# Patient Record
Sex: MALE | Race: WHITE | HISPANIC OR LATINO | Employment: FULL TIME | URBAN - METROPOLITAN AREA
[De-identification: names, ages, dates, MRNs, and addresses within clinical notes are randomized per-mention and may not be internally consistent; named-entity substitution may affect disease eponyms.]

---

## 2019-12-02 ENCOUNTER — APPOINTMENT (OUTPATIENT)
Dept: RADIOLOGY | Facility: MEDICAL CENTER | Age: 34
End: 2019-12-02
Attending: EMERGENCY MEDICINE

## 2019-12-02 ENCOUNTER — HOSPITAL ENCOUNTER (EMERGENCY)
Facility: MEDICAL CENTER | Age: 34
End: 2019-12-02
Attending: EMERGENCY MEDICINE

## 2019-12-02 VITALS
RESPIRATION RATE: 16 BRPM | DIASTOLIC BLOOD PRESSURE: 78 MMHG | WEIGHT: 224 LBS | TEMPERATURE: 98.7 F | BODY MASS INDEX: 37.32 KG/M2 | HEART RATE: 76 BPM | SYSTOLIC BLOOD PRESSURE: 126 MMHG | HEIGHT: 65 IN

## 2019-12-02 DIAGNOSIS — R07.2 PRECORDIAL PAIN: ICD-10-CM

## 2019-12-02 DIAGNOSIS — M79.605 PAIN OF LEFT LOWER EXTREMITY: ICD-10-CM

## 2019-12-02 LAB
ALBUMIN SERPL BCP-MCNC: 4.4 G/DL (ref 3.2–4.9)
ALBUMIN/GLOB SERPL: 1.7 G/DL
ALP SERPL-CCNC: 58 U/L (ref 30–99)
ALT SERPL-CCNC: 37 U/L (ref 2–50)
ANION GAP SERPL CALC-SCNC: 10 MMOL/L (ref 0–11.9)
AST SERPL-CCNC: 19 U/L (ref 12–45)
BASOPHILS # BLD AUTO: 0.7 % (ref 0–1.8)
BASOPHILS # BLD: 0.06 K/UL (ref 0–0.12)
BILIRUB SERPL-MCNC: 0.4 MG/DL (ref 0.1–1.5)
BUN SERPL-MCNC: 14 MG/DL (ref 8–22)
CALCIUM SERPL-MCNC: 9.2 MG/DL (ref 8.5–10.5)
CHLORIDE SERPL-SCNC: 105 MMOL/L (ref 96–112)
CO2 SERPL-SCNC: 22 MMOL/L (ref 20–33)
CREAT SERPL-MCNC: 0.69 MG/DL (ref 0.5–1.4)
D DIMER PPP IA.FEU-MCNC: <0.4 UG/ML (FEU) (ref 0–0.5)
EKG IMPRESSION: NORMAL
EOSINOPHIL # BLD AUTO: 0.23 K/UL (ref 0–0.51)
EOSINOPHIL NFR BLD: 2.6 % (ref 0–6.9)
ERYTHROCYTE [DISTWIDTH] IN BLOOD BY AUTOMATED COUNT: 44 FL (ref 35.9–50)
GLOBULIN SER CALC-MCNC: 2.6 G/DL (ref 1.9–3.5)
GLUCOSE SERPL-MCNC: 115 MG/DL (ref 65–99)
HCT VFR BLD AUTO: 42.8 % (ref 42–52)
HGB BLD-MCNC: 14.5 G/DL (ref 14–18)
IMM GRANULOCYTES # BLD AUTO: 0.02 K/UL (ref 0–0.11)
IMM GRANULOCYTES NFR BLD AUTO: 0.2 % (ref 0–0.9)
LIPASE SERPL-CCNC: 19 U/L (ref 11–82)
LYMPHOCYTES # BLD AUTO: 2.76 K/UL (ref 1–4.8)
LYMPHOCYTES NFR BLD: 30.8 % (ref 22–41)
MCH RBC QN AUTO: 30.8 PG (ref 27–33)
MCHC RBC AUTO-ENTMCNC: 33.9 G/DL (ref 33.7–35.3)
MCV RBC AUTO: 90.9 FL (ref 81.4–97.8)
MONOCYTES # BLD AUTO: 0.73 K/UL (ref 0–0.85)
MONOCYTES NFR BLD AUTO: 8.1 % (ref 0–13.4)
NEUTROPHILS # BLD AUTO: 5.16 K/UL (ref 1.82–7.42)
NEUTROPHILS NFR BLD: 57.6 % (ref 44–72)
NRBC # BLD AUTO: 0 K/UL
NRBC BLD-RTO: 0 /100 WBC
PLATELET # BLD AUTO: 206 K/UL (ref 164–446)
PMV BLD AUTO: 10.8 FL (ref 9–12.9)
POTASSIUM SERPL-SCNC: 3.4 MMOL/L (ref 3.6–5.5)
PROT SERPL-MCNC: 7 G/DL (ref 6–8.2)
RBC # BLD AUTO: 4.71 M/UL (ref 4.7–6.1)
SODIUM SERPL-SCNC: 137 MMOL/L (ref 135–145)
TROPONIN T SERPL-MCNC: <6 NG/L (ref 6–19)
WBC # BLD AUTO: 9 K/UL (ref 4.8–10.8)

## 2019-12-02 PROCEDURE — 80053 COMPREHEN METABOLIC PANEL: CPT

## 2019-12-02 PROCEDURE — 84484 ASSAY OF TROPONIN QUANT: CPT

## 2019-12-02 PROCEDURE — 85379 FIBRIN DEGRADATION QUANT: CPT

## 2019-12-02 PROCEDURE — 700111 HCHG RX REV CODE 636 W/ 250 OVERRIDE (IP): Performed by: EMERGENCY MEDICINE

## 2019-12-02 PROCEDURE — 36415 COLL VENOUS BLD VENIPUNCTURE: CPT

## 2019-12-02 PROCEDURE — 99285 EMERGENCY DEPT VISIT HI MDM: CPT

## 2019-12-02 PROCEDURE — 93971 EXTREMITY STUDY: CPT | Mod: 26 | Performed by: INTERNAL MEDICINE

## 2019-12-02 PROCEDURE — 71045 X-RAY EXAM CHEST 1 VIEW: CPT

## 2019-12-02 PROCEDURE — 93971 EXTREMITY STUDY: CPT | Mod: LT

## 2019-12-02 PROCEDURE — 93005 ELECTROCARDIOGRAM TRACING: CPT | Performed by: EMERGENCY MEDICINE

## 2019-12-02 PROCEDURE — 96374 THER/PROPH/DIAG INJ IV PUSH: CPT

## 2019-12-02 PROCEDURE — 85025 COMPLETE CBC W/AUTO DIFF WBC: CPT

## 2019-12-02 PROCEDURE — 83690 ASSAY OF LIPASE: CPT

## 2019-12-02 RX ORDER — ASPIRIN 81 MG/1
324 TABLET, CHEWABLE ORAL ONCE
Status: DISCONTINUED | OUTPATIENT
Start: 2019-12-02 | End: 2019-12-02 | Stop reason: HOSPADM

## 2019-12-02 RX ORDER — OMEPRAZOLE 20 MG/1
20 CAPSULE, DELAYED RELEASE ORAL
COMMUNITY
End: 2020-09-21

## 2019-12-02 RX ORDER — KETOROLAC TROMETHAMINE 30 MG/ML
30 INJECTION, SOLUTION INTRAMUSCULAR; INTRAVENOUS ONCE
Status: COMPLETED | OUTPATIENT
Start: 2019-12-02 | End: 2019-12-02

## 2019-12-02 RX ORDER — NAPROXEN 500 MG/1
500 TABLET ORAL 2 TIMES DAILY WITH MEALS
Qty: 20 TAB | Refills: 0 | Status: SHIPPED | OUTPATIENT
Start: 2019-12-02 | End: 2020-09-21 | Stop reason: CLARIF

## 2019-12-02 RX ADMIN — KETOROLAC TROMETHAMINE 30 MG: 30 INJECTION, SOLUTION INTRAMUSCULAR at 10:27

## 2019-12-02 ASSESSMENT — LIFESTYLE VARIABLES: DO YOU DRINK ALCOHOL: NO

## 2019-12-02 NOTE — ED TRIAGE NOTES
"Chief Complaint   Patient presents with   • Chest Pain     BIB EMS.  Per patient has onset on sternal chest pain starting 2 hrs PTA.  Patient denies SOB, N/V or fever.  NAD.  A&Ox4.  Patient would also like his left foot to be \"checked out\" as it has been painful.  Denies trauma.  No swelling noted.        "

## 2019-12-02 NOTE — ED PROVIDER NOTES
"ED Provider  Scribed for Nigel Lomax D.O. by Brisa Paulino. 12/2/2019  9:57 AM    Means of arrival: Ambulance  History obtained from: Patient  History limited by: None    CHIEF COMPLAINT  •  Extremity Pain  •  Chest Pain    HPI  Sridhar Underwood is a 34 y.o. male who presents to the ED by ambulance for extremity pain with an onset of 2 days as well as chest pain which developed today. The patient developed an acute onset of left lower extremity two days ago localized from his foot and ankle extending to the back of his calf and knee. He describes the pain as severe soreness which is gradually worsening. He denies treating his symptoms with any medications. He complains of numbness to his toes and mild discomfort to his right hip. Negative for trauma or injury. Additionally, he complains of acute chest pain to his substernal chest extending to his anterior neck, onset this morning. He has a history of GERD which is treated with Omeprazole. He has experienced similar episodes of chest pain secondary to acid reflux but states his current chest pain is different and more severe. Negative for fevers, chills, sore throat, myalgias, abdominal pain, nausea, vomiting, diarrhea or rash. No prior history of cardiac disease, blood clot disorder, PE or DVT.      REVIEW OF SYSTEMS  See HPI for further details. All other systems are negative.       PAST MEDICAL HISTORY  No prior history of cardiac disease, blood clot disorder, PE or DVT. History of GERD.      SOCIAL HISTORY  Patient is employed.      SURGICAL HISTORY  No recent surgeries.      CURRENT MEDICATIONS  Patient reports taking Omeprazole daily.      ALLERGIES  None      PHYSICAL EXAM  VITAL SIGNS: /87   Pulse 71   Temp 37.1 °C (98.7 °F) (Temporal)   Resp 18   Ht 1.651 m (5' 5\")   Wt 101.6 kg (224 lb)   BMI 37.28 kg/m²     Constitutional: Alert in no apparent distress.  HENT: No signs of trauma, mucous membranes are moist  Eyes: Conjunctiva normal, " Non-icteric.   Neck: Normal range of motion, No tenderness, Supple.  Lymphatic: No lymphadenopathy noted.   Cardiovascular: Regular rate and rhythm, no murmurs.   Thorax & Lungs: Normal breath sounds, No respiratory distress, No wheezing, No chest tenderness.   Abdomen: Bowel sounds normal, Soft, No tenderness, No masses, No pulsatile masses. No peritoneal signs.  Skin: Warm, Dry, normal color.   Back: No bony tenderness, No CVA tenderness.   Extremities: No cyanosis. Left ankle and foot no swelling, tenderness or erythema. Left calf tenderness.  Musculoskeletal: Good range of motion in all major joints. No tenderness to palpation or major deformities noted.   Neurologic: Alert and oriented x4, Normal motor function, Normal sensory function, No focal deficits noted.   Psychiatric: Mild anxiety.       MEDICAL DECISION MAKING  This is a 34 y.o. male who presents primarily with complaints of left ankle foot and calf pain.  He has been atraumatic and he developed having some nonspecific chest discomfort also.  With this it was concerns of a DVT with possible pulmonary embolism so ultrasound of the lower extremity was ordered.  D-dimer was also ordered and was negative.  His ultrasound of the lower extremity was negative d-dimer is negative further imaging of the chest is not necessary.  His cardiac evaluation was negative, his chest x-ray looks normal.  His ultrasound showed no DVT.  He is medicated with Toradol for pain and this did improve his symptoms.  At this time the patient has nonspecific symptoms that I can find no emergent or urgent for.  He he will be treated symptomatically.    DIAGNOSTIC STUDIES / PROCEDURES    EKG  12 Lead EKG interpreted by me shown below.      LABS  Results for orders placed or performed during the hospital encounter of 12/02/19   CBC w/ Differential   Result Value Ref Range    WBC 9.0 4.8 - 10.8 K/uL    RBC 4.71 4.70 - 6.10 M/uL    Hemoglobin 14.5 14.0 - 18.0 g/dL    Hematocrit 42.8  42.0 - 52.0 %    MCV 90.9 81.4 - 97.8 fL    MCH 30.8 27.0 - 33.0 pg    MCHC 33.9 33.7 - 35.3 g/dL    RDW 44.0 35.9 - 50.0 fL    Platelet Count 206 164 - 446 K/uL    MPV 10.8 9.0 - 12.9 fL    Neutrophils-Polys 57.60 44.00 - 72.00 %    Lymphocytes 30.80 22.00 - 41.00 %    Monocytes 8.10 0.00 - 13.40 %    Eosinophils 2.60 0.00 - 6.90 %    Basophils 0.70 0.00 - 1.80 %    Immature Granulocytes 0.20 0.00 - 0.90 %    Nucleated RBC 0.00 /100 WBC    Neutrophils (Absolute) 5.16 1.82 - 7.42 K/uL    Lymphs (Absolute) 2.76 1.00 - 4.80 K/uL    Monos (Absolute) 0.73 0.00 - 0.85 K/uL    Eos (Absolute) 0.23 0.00 - 0.51 K/uL    Baso (Absolute) 0.06 0.00 - 0.12 K/uL    Immature Granulocytes (abs) 0.02 0.00 - 0.11 K/uL    NRBC (Absolute) 0.00 K/uL   Complete Metabolic Panel (CMP)   Result Value Ref Range    Sodium 137 135 - 145 mmol/L    Potassium 3.4 (L) 3.6 - 5.5 mmol/L    Chloride 105 96 - 112 mmol/L    Co2 22 20 - 33 mmol/L    Anion Gap 10.0 0.0 - 11.9    Glucose 115 (H) 65 - 99 mg/dL    Bun 14 8 - 22 mg/dL    Creatinine 0.69 0.50 - 1.40 mg/dL    Calcium 9.2 8.5 - 10.5 mg/dL    AST(SGOT) 19 12 - 45 U/L    ALT(SGPT) 37 2 - 50 U/L    Alkaline Phosphatase 58 30 - 99 U/L    Total Bilirubin 0.4 0.1 - 1.5 mg/dL    Albumin 4.4 3.2 - 4.9 g/dL    Total Protein 7.0 6.0 - 8.2 g/dL    Globulin 2.6 1.9 - 3.5 g/dL    A-G Ratio 1.7 g/dL   Troponin STAT   Result Value Ref Range    Troponin T <6 6 - 19 ng/L   Lipase   Result Value Ref Range    Lipase 19 11 - 82 U/L   D-Dimer (only helpful in low pre-test probability wells critieria. Do not order if patient ruled out by PERC criteria. See Weblinks at top of Labs section)   Result Value Ref Range    D-Dimer Screen <0.40 0.00 - 0.50 ug/mL (FEU)   ESTIMATED GFR   Result Value Ref Range    GFR If African American >60 >60 mL/min/1.73 m 2    GFR If Non African American >60 >60 mL/min/1.73 m 2   EKG (NOW)   Result Value Ref Range    Report       [facility] Emergency Dept.    Test Date:   2019  Pt Name:    DOC BOWLES                 Department: ER  MRN:        [id number]                      Room:       RD 10  Gender:     Male                         Technician: [id number]  :        1985                   Requested By:ER TRIAGE PROTOCOL  Order #:    [id number]                    Reading MD: MARCELLA OLSON D.O.    Measurements  Intervals                                Axis  Rate:       78                           P:          19  CO:         184                          QRS:        12  QRSD:       84                           T:          15  QT:         368  QTc:        420    Interpretive Statements  SINUS RHYTHM  No previous ECG available for comparison  Electronically Signed On 2019 12:14:51 PST by MARCELLA OLSON D.O.        All labs reviewed by me.      RADIOLOGY  DX-CHEST-PORTABLE (1 VIEW)   Final Result      1.  There is no acute cardiopulmonary process.      US-EXTREMITY VENOUS LOWER UNILAT LEFT   Final Result        The radiologist's interpretations of all radiological studies have been reviewed by me.          COURSE  Pertinent Labs & Imaging studies reviewed. (See chart for details)    Obtained and reviewed past medical records shows no prior ED visits.    9:57 AM Patient seen and examined at bedside. Discussed plan of care. The patient will be  medicated with Aspirin 324 mg PO and Toradol 30 mg IV. Ordered for venous US left lower extremity, XR chest, EKG and laboratory testing including lipase, troponin, CMP, estimated GFR, CBC with differential and D-dimer to evaluate his symptoms.     10:45 AM Independently reviewed XR chest revealing no evidence of pneumonia.    12:14 PM On repeat evaluation, pain is almost completely resolved with Toradol. Discussed with him the diagnostic results and plan for admission. Heart score is 0.     Discharge plan was discussed with the patient and includes establishing care with a PCP as soon as possible.  Patient will be discharged with  "a prescription for Naproxen.       Return to the ED for new or persisting symptoms including chest pain, shortness of breath or any additional concerns.  The patient verbalizes understanding and will comply.  Patient is stable at the time of discharge.  Vital signs were reviewed: /87   Pulse 71   Temp 37.1 °C (98.7 °F) (Temporal)   Resp 18   Ht 1.651 m (5' 5\")   Wt 101.6 kg (224 lb)   BMI 37.28 kg/m²        DISPOSITION  Patient will be discharged home in stable condition.      FOLLOW UP  [facility] scheduling  Please call [phone] to make an appoint with a next available practitioner    The patient is referred to a primary physician for blood pressure management, diabetic screening, and for all other preventative health concerns.      OUTPATIENT MEDICATIONS  New Prescriptions    NAPROXEN (NAPROSYN) 500 MG TAB    Take 1 Tab by mouth 2 times a day, with meals.        FINAL IMPRESSION  1. Precordial pain    2. Pain of left lower extremity         Brisa CANSECO (Enzoibjustin), am scribing for, and in the presence of, Nigel Lomax D.O.    Electronically signed by: Brisa Paulino (Scribe), 12/2/2019    Nigel CANSECO D.O. personally performed the services described in this documentation, as scribed by Brisa Paulino in my presence, and it is both accurate and complete. C.     The note accurately reflects work and decisions made by me.  Nigel Lomax  12/2/2019  1:17 PM        "

## 2019-12-02 NOTE — DISCHARGE INSTRUCTIONS
Activity as tolerated, please follow-up with a primary care physician. Please follow up with a primary physician for blood pressure management, diabetic screening, and all other preventive health concerns.

## 2020-04-04 ENCOUNTER — APPOINTMENT (OUTPATIENT)
Dept: RADIOLOGY | Facility: MEDICAL CENTER | Age: 35
End: 2020-04-04
Attending: EMERGENCY MEDICINE
Payer: COMMERCIAL

## 2020-04-04 ENCOUNTER — HOSPITAL ENCOUNTER (EMERGENCY)
Facility: MEDICAL CENTER | Age: 35
End: 2020-04-04
Attending: EMERGENCY MEDICINE
Payer: COMMERCIAL

## 2020-04-04 VITALS
HEART RATE: 81 BPM | OXYGEN SATURATION: 99 % | HEIGHT: 65 IN | WEIGHT: 190 LBS | SYSTOLIC BLOOD PRESSURE: 128 MMHG | BODY MASS INDEX: 31.65 KG/M2 | DIASTOLIC BLOOD PRESSURE: 79 MMHG | RESPIRATION RATE: 18 BRPM | TEMPERATURE: 99.3 F

## 2020-04-04 DIAGNOSIS — K21.00 GASTROESOPHAGEAL REFLUX DISEASE WITH ESOPHAGITIS: ICD-10-CM

## 2020-04-04 LAB
ALBUMIN SERPL BCP-MCNC: 4.4 G/DL (ref 3.2–4.9)
ALBUMIN/GLOB SERPL: 1.4 G/DL
ALP SERPL-CCNC: 75 U/L (ref 30–99)
ALT SERPL-CCNC: 45 U/L (ref 2–50)
ANION GAP SERPL CALC-SCNC: 13 MMOL/L (ref 7–16)
AST SERPL-CCNC: 21 U/L (ref 12–45)
BASOPHILS # BLD AUTO: 0.6 % (ref 0–1.8)
BASOPHILS # BLD: 0.06 K/UL (ref 0–0.12)
BILIRUB SERPL-MCNC: 0.3 MG/DL (ref 0.1–1.5)
BUN SERPL-MCNC: 14 MG/DL (ref 8–22)
CALCIUM SERPL-MCNC: 9.3 MG/DL (ref 8.5–10.5)
CHLORIDE SERPL-SCNC: 100 MMOL/L (ref 96–112)
CO2 SERPL-SCNC: 22 MMOL/L (ref 20–33)
CREAT SERPL-MCNC: 0.62 MG/DL (ref 0.5–1.4)
EOSINOPHIL # BLD AUTO: 0.24 K/UL (ref 0–0.51)
EOSINOPHIL NFR BLD: 2.4 % (ref 0–6.9)
ERYTHROCYTE [DISTWIDTH] IN BLOOD BY AUTOMATED COUNT: 43.4 FL (ref 35.9–50)
GLOBULIN SER CALC-MCNC: 3.1 G/DL (ref 1.9–3.5)
GLUCOSE SERPL-MCNC: 89 MG/DL (ref 65–99)
HCT VFR BLD AUTO: 45.7 % (ref 42–52)
HGB BLD-MCNC: 15.8 G/DL (ref 14–18)
IMM GRANULOCYTES # BLD AUTO: 0.07 K/UL (ref 0–0.11)
IMM GRANULOCYTES NFR BLD AUTO: 0.7 % (ref 0–0.9)
LIPASE SERPL-CCNC: 14 U/L (ref 11–82)
LYMPHOCYTES # BLD AUTO: 2.87 K/UL (ref 1–4.8)
LYMPHOCYTES NFR BLD: 28.4 % (ref 22–41)
MCH RBC QN AUTO: 31.3 PG (ref 27–33)
MCHC RBC AUTO-ENTMCNC: 34.6 G/DL (ref 33.7–35.3)
MCV RBC AUTO: 90.7 FL (ref 81.4–97.8)
MONOCYTES # BLD AUTO: 0.74 K/UL (ref 0–0.85)
MONOCYTES NFR BLD AUTO: 7.3 % (ref 0–13.4)
NEUTROPHILS # BLD AUTO: 6.13 K/UL (ref 1.82–7.42)
NEUTROPHILS NFR BLD: 60.6 % (ref 44–72)
NRBC # BLD AUTO: 0 K/UL
NRBC BLD-RTO: 0 /100 WBC
PLATELET # BLD AUTO: 205 K/UL (ref 164–446)
PMV BLD AUTO: 10.7 FL (ref 9–12.9)
POTASSIUM SERPL-SCNC: 4 MMOL/L (ref 3.6–5.5)
PROT SERPL-MCNC: 7.5 G/DL (ref 6–8.2)
RBC # BLD AUTO: 5.04 M/UL (ref 4.7–6.1)
S PYO DNA SPEC NAA+PROBE: NOT DETECTED
SODIUM SERPL-SCNC: 135 MMOL/L (ref 135–145)
TROPONIN T SERPL-MCNC: <6 NG/L (ref 6–19)
WBC # BLD AUTO: 10.1 K/UL (ref 4.8–10.8)

## 2020-04-04 PROCEDURE — 71045 X-RAY EXAM CHEST 1 VIEW: CPT

## 2020-04-04 PROCEDURE — A9270 NON-COVERED ITEM OR SERVICE: HCPCS | Performed by: EMERGENCY MEDICINE

## 2020-04-04 PROCEDURE — 80053 COMPREHEN METABOLIC PANEL: CPT

## 2020-04-04 PROCEDURE — 87651 STREP A DNA AMP PROBE: CPT

## 2020-04-04 PROCEDURE — 93005 ELECTROCARDIOGRAM TRACING: CPT | Performed by: EMERGENCY MEDICINE

## 2020-04-04 PROCEDURE — 85025 COMPLETE CBC W/AUTO DIFF WBC: CPT

## 2020-04-04 PROCEDURE — 84484 ASSAY OF TROPONIN QUANT: CPT

## 2020-04-04 PROCEDURE — 83690 ASSAY OF LIPASE: CPT

## 2020-04-04 PROCEDURE — 700102 HCHG RX REV CODE 250 W/ 637 OVERRIDE(OP): Performed by: EMERGENCY MEDICINE

## 2020-04-04 PROCEDURE — 99284 EMERGENCY DEPT VISIT MOD MDM: CPT

## 2020-04-04 PROCEDURE — 36415 COLL VENOUS BLD VENIPUNCTURE: CPT

## 2020-04-04 RX ORDER — PANTOPRAZOLE SODIUM 40 MG/1
40 TABLET, DELAYED RELEASE ORAL DAILY
Qty: 30 TAB | Refills: 1 | Status: SHIPPED | OUTPATIENT
Start: 2020-04-04 | End: 2020-09-21 | Stop reason: CLARIF

## 2020-04-04 RX ORDER — FAMOTIDINE 40 MG/1
40 TABLET, FILM COATED ORAL 2 TIMES DAILY
Qty: 30 TAB | Refills: 1 | Status: SHIPPED | OUTPATIENT
Start: 2020-04-04 | End: 2020-09-21 | Stop reason: CLARIF

## 2020-04-04 RX ORDER — HYDROXYZINE 50 MG/1
50 TABLET, FILM COATED ORAL 3 TIMES DAILY PRN
Qty: 30 TAB | Refills: 0 | Status: SHIPPED | OUTPATIENT
Start: 2020-04-04 | End: 2020-09-21 | Stop reason: CLARIF

## 2020-04-04 RX ADMIN — LIDOCAINE HYDROCHLORIDE 30 ML: 20 SOLUTION OROPHARYNGEAL at 12:55

## 2020-04-04 SDOH — HEALTH STABILITY: MENTAL HEALTH: HOW OFTEN DO YOU HAVE 6 OR MORE DRINKS ON ONE OCCASION?: NEVER

## 2020-04-04 SDOH — HEALTH STABILITY: MENTAL HEALTH: HOW MANY STANDARD DRINKS CONTAINING ALCOHOL DO YOU HAVE ON A TYPICAL DAY?: 1 OR 2

## 2020-04-04 SDOH — HEALTH STABILITY: MENTAL HEALTH: HOW OFTEN DO YOU HAVE A DRINK CONTAINING ALCOHOL?: MONTHLY OR LESS

## 2020-04-04 ASSESSMENT — LIFESTYLE VARIABLES
EVER HAD A DRINK FIRST THING IN THE MORNING TO STEADY YOUR NERVES TO GET RID OF A HANGOVER: NO
HOW MANY TIMES IN THE PAST YEAR HAVE YOU HAD 5 OR MORE DRINKS IN A DAY: 0
AVERAGE NUMBER OF DAYS PER WEEK YOU HAVE A DRINK CONTAINING ALCOHOL: 1
HAVE PEOPLE ANNOYED YOU BY CRITICIZING YOUR DRINKING: NO
TOTAL SCORE: 0
DO YOU DRINK ALCOHOL: YES
HAVE YOU EVER FELT YOU SHOULD CUT DOWN ON YOUR DRINKING: NO
EVER FELT BAD OR GUILTY ABOUT YOUR DRINKING: NO
ON A TYPICAL DAY WHEN YOU DRINK ALCOHOL HOW MANY DRINKS DO YOU HAVE: 1
DOES PATIENT WANT TO STOP DRINKING: NO
TOTAL SCORE: 0
TOTAL SCORE: 0
CONSUMPTION TOTAL: NEGATIVE

## 2020-04-04 ASSESSMENT — FIBROSIS 4 INDEX: FIB4 SCORE: 0.52

## 2020-04-04 NOTE — ED NOTES
PIV removed and bandaged.  Sridhar Underwood discharged via ambulation with self.  Discharge instructions given and reviewed, patient educated to follow up with GI consultants, verbalized understanding.  Prescriptions given x 3.  All personal belongings in possession.  No questions at this time.

## 2020-04-04 NOTE — ED NOTES
EKG completed, ERP denies need for cardiac monitoring at this time.  Blood and strep swab sent to lab.  Patient medicated per MAR, resting on chair, NAD noted.     Imaging at bedside.

## 2020-04-04 NOTE — ED NOTES
Charge RN and ERP make aware of patient situation including chest pain - per ERP, patient will be seen in TCS and EKG delayed until further notice from ERP.

## 2020-04-04 NOTE — ED TRIAGE NOTES
Chief Complaint   Patient presents with   • Shortness of Breath     x2 weeks, speaking in full sentences.    • Sore Throat     x2 weeks   • Chest Pain     x2 weeks, intermittent, raidating t bilateral arms, pain 10/10.      Pt amblatory to triage, steady gait, A&O.  Mask applied, verbalized understanding.

## 2020-04-05 NOTE — ED PROVIDER NOTES
ED Provider Note    CHIEF COMPLAINT  Chief Complaint   Patient presents with   • Shortness of Breath     x2 weeks, speaking in full sentences.    • Sore Throat     x2 weeks   • Chest Pain     x2 weeks, intermittent, raidating t bilateral arms, pain 10/10.        HPI  Sridhar Underwood is a 35 y.o. male who presents to the emergency room today with complaints of shortness of breath x2 weeks, sore throat x2 weeks and chest pain.  Patient's pain is anterior chest wall radiates up towards his throat described as burning.  No nausea vomiting no fever chills no changes to bowel or bladder.  Symptoms been progressively worse prompting patient to come to the emergency room today.  He has had no exposure with Covid 19, contacts or recent travel outside the area.    REVIEW OF SYSTEMS  See HPI for further details. All other systems are negative.     PAST MEDICAL HISTORY  No past medical history on file.    FAMILY HISTORY  [unfilled]    SOCIAL HISTORY  Social History     Socioeconomic History   • Marital status: Single     Spouse name: Not on file   • Number of children: Not on file   • Years of education: Not on file   • Highest education level: Not on file   Occupational History   • Not on file   Social Needs   • Financial resource strain: Not on file   • Food insecurity     Worry: Not on file     Inability: Not on file   • Transportation needs     Medical: Not on file     Non-medical: Not on file   Tobacco Use   • Smoking status: Current Every Day Smoker     Packs/day: 0.25     Years: 20.00     Pack years: 5.00     Types: Cigarettes   • Smokeless tobacco: Never Used   Substance and Sexual Activity   • Alcohol use: Yes     Frequency: Monthly or less     Drinks per session: 1 or 2     Binge frequency: Never   • Drug use: Yes     Types: Inhaled     Comment: marijuana   • Sexual activity: Not on file   Lifestyle   • Physical activity     Days per week: Not on file     Minutes per session: Not on file   • Stress: Not on file  "  Relationships   • Social connections     Talks on phone: Not on file     Gets together: Not on file     Attends Sikh service: Not on file     Active member of club or organization: Not on file     Attends meetings of clubs or organizations: Not on file     Relationship status: Not on file   • Intimate partner violence     Fear of current or ex partner: Not on file     Emotionally abused: Not on file     Physically abused: Not on file     Forced sexual activity: Not on file   Other Topics Concern   • Not on file   Social History Narrative   • Not on file       SURGICAL HISTORY  No past surgical history on file.    CURRENT MEDICATIONS  Home Medications     Reviewed by Ciarra Gar R.N. (Registered Nurse) on 04/04/20 at 1223  Med List Status: Complete   Medication Last Dose Status   naproxen (NAPROSYN) 500 MG Tab prn Active   omeprazole (PRILOSEC) 20 MG delayed-release capsule 4/3/2020 Active                ALLERGIES  No Known Allergies    PHYSICAL EXAM  VITAL SIGNS: /79   Pulse 81   Temp 37.4 °C (99.3 °F) (Temporal)   Resp 18   Ht 1.651 m (5' 5\")   Wt 86.2 kg (190 lb)   SpO2 99%   BMI 31.62 kg/m²       Constitutional: Well developed, Well nourished, mild acute distress, Non-toxic appearance.   HENT: Normocephalic, Atraumatic, Bilateral external ears normal, Oropharynx moist, No oral exudates, Nose normal.   Eyes: PERRLA, EOMI, Conjunctiva normal, No discharge.   Neck: Normal range of motion, No tenderness, Supple, No stridor.   Lymphatic: No lymphadenopathy noted.   Cardiovascular: Normal heart rate, Normal rhythm, No murmurs, No rubs, No gallops.   Thorax & Lungs: Normal breath sounds, No respiratory distress, No wheezing, No chest tenderness.   Abdomen: Bowel sounds normal, Soft, epigastric tenderness, No masses, No pulsatile masses.  No rebound, guarding or peritoneal signs noted  Skin: Warm, Dry, No erythema, No rash.   Back: No tenderness, No CVA tenderness.    Extremities: Intact " distal pulses, No edema, No tenderness, No cyanosis, No clubbing.   Musculoskeletal: Good range of motion in all major joints. No tenderness to palpation or major deformities noted.   Neurologic: Alert & oriented x 3, Normal motor function, Normal sensory function, No focal deficits noted.   Psychiatric: Affect normal, Judgment normal, Mood normal.     EKG  Normal sinus rhythm at 80 bpm, no ST elevation or depression, no widening of the QRS complex, good R wave progression, OK levels are normal, this is a 12-lead EKG no previous EKG available to staff comparison.    RADIOLOGY/PROCEDURES  DX-CHEST-PORTABLE (1 VIEW)   Final Result      1.  There is minimal right lower lobe hazy opacity which could be atelectasis at the lungs are slightly hypoinflated.            COURSE & MEDICAL DECISION MAKING  Pertinent Labs & Imaging studies reviewed. (See chart for details)  Patient has some atelectasis on x-ray otherwise no other acute process troponin is normal EKG shows no ischemic I do not feel that this is cardiac or ischemic chest pain.  He was given GI cocktail with resolution of his symptoms felt much improved.  He will be discontinued off Prilosec and placed on Protonix daily and famotidine.  We discussed decreasing caffeine products and alcohol.  He was given list of food products to avoid.  He was given referral to GI at [facility] will contact for follow-up.  Return persistent worsening symptoms.  He is very anxious and I discussed with him that this is causing some problems and he was given Atarax to help for this.  He verbalizes understand instructions need for follow-up as described above.    FINAL IMPRESSION  1.  Acute chest wall pain  2.  GERD with esophagitis  3.         Electronically signed by: Julio Cueva D.O., 4/4/2020 6:31 PM

## 2020-04-10 LAB — EKG IMPRESSION: NORMAL

## 2020-09-21 ENCOUNTER — HOSPITAL ENCOUNTER (EMERGENCY)
Dept: RADIOLOGY | Facility: MEDICAL CENTER | Age: 35
End: 2020-09-21
Attending: EMERGENCY MEDICINE
Payer: COMMERCIAL

## 2020-09-21 ENCOUNTER — HOSPITAL ENCOUNTER (EMERGENCY)
Facility: MEDICAL CENTER | Age: 35
End: 2020-09-21
Attending: EMERGENCY MEDICINE
Payer: COMMERCIAL

## 2020-09-21 VITALS
BODY MASS INDEX: 34.93 KG/M2 | TEMPERATURE: 98.8 F | HEIGHT: 66 IN | WEIGHT: 217.37 LBS | RESPIRATION RATE: 16 BRPM | DIASTOLIC BLOOD PRESSURE: 73 MMHG | HEART RATE: 69 BPM | OXYGEN SATURATION: 97 % | SYSTOLIC BLOOD PRESSURE: 134 MMHG

## 2020-09-21 DIAGNOSIS — M94.0 COSTOCHONDRITIS: ICD-10-CM

## 2020-09-21 LAB
ALBUMIN SERPL BCP-MCNC: 4.3 G/DL (ref 3.2–4.9)
ALBUMIN/GLOB SERPL: 1.6 G/DL
ALP SERPL-CCNC: 72 U/L (ref 30–99)
ALT SERPL-CCNC: 31 U/L (ref 2–50)
ANION GAP SERPL CALC-SCNC: 14 MMOL/L (ref 7–16)
AST SERPL-CCNC: 20 U/L (ref 12–45)
BASOPHILS # BLD AUTO: 0.4 % (ref 0–1.8)
BASOPHILS # BLD: 0.04 K/UL (ref 0–0.12)
BILIRUB SERPL-MCNC: 0.3 MG/DL (ref 0.1–1.5)
BUN SERPL-MCNC: 14 MG/DL (ref 8–22)
CALCIUM SERPL-MCNC: 9.1 MG/DL (ref 8.5–10.5)
CHLORIDE SERPL-SCNC: 105 MMOL/L (ref 96–112)
CO2 SERPL-SCNC: 20 MMOL/L (ref 20–33)
CREAT SERPL-MCNC: 0.66 MG/DL (ref 0.5–1.4)
EKG IMPRESSION: NORMAL
EOSINOPHIL # BLD AUTO: 0.28 K/UL (ref 0–0.51)
EOSINOPHIL NFR BLD: 2.7 % (ref 0–6.9)
ERYTHROCYTE [DISTWIDTH] IN BLOOD BY AUTOMATED COUNT: 44.5 FL (ref 35.9–50)
GLOBULIN SER CALC-MCNC: 2.7 G/DL (ref 1.9–3.5)
GLUCOSE SERPL-MCNC: 110 MG/DL (ref 65–99)
HCT VFR BLD AUTO: 43.3 % (ref 42–52)
HGB BLD-MCNC: 15 G/DL (ref 14–18)
IMM GRANULOCYTES # BLD AUTO: 0.06 K/UL (ref 0–0.11)
IMM GRANULOCYTES NFR BLD AUTO: 0.6 % (ref 0–0.9)
LYMPHOCYTES # BLD AUTO: 2.93 K/UL (ref 1–4.8)
LYMPHOCYTES NFR BLD: 27.8 % (ref 22–41)
MCH RBC QN AUTO: 31.5 PG (ref 27–33)
MCHC RBC AUTO-ENTMCNC: 34.6 G/DL (ref 33.7–35.3)
MCV RBC AUTO: 91 FL (ref 81.4–97.8)
MONOCYTES # BLD AUTO: 0.73 K/UL (ref 0–0.85)
MONOCYTES NFR BLD AUTO: 6.9 % (ref 0–13.4)
NEUTROPHILS # BLD AUTO: 6.49 K/UL (ref 1.82–7.42)
NEUTROPHILS NFR BLD: 61.6 % (ref 44–72)
NRBC # BLD AUTO: 0 K/UL
NRBC BLD-RTO: 0 /100 WBC
PLATELET # BLD AUTO: 237 K/UL (ref 164–446)
PMV BLD AUTO: 11 FL (ref 9–12.9)
POTASSIUM SERPL-SCNC: 3.7 MMOL/L (ref 3.6–5.5)
PROT SERPL-MCNC: 7 G/DL (ref 6–8.2)
RBC # BLD AUTO: 4.76 M/UL (ref 4.7–6.1)
SODIUM SERPL-SCNC: 139 MMOL/L (ref 135–145)
TROPONIN T SERPL-MCNC: 11 NG/L (ref 6–19)
WBC # BLD AUTO: 10.5 K/UL (ref 4.8–10.8)

## 2020-09-21 PROCEDURE — 93005 ELECTROCARDIOGRAM TRACING: CPT | Performed by: EMERGENCY MEDICINE

## 2020-09-21 PROCEDURE — A9270 NON-COVERED ITEM OR SERVICE: HCPCS | Performed by: EMERGENCY MEDICINE

## 2020-09-21 PROCEDURE — 700102 HCHG RX REV CODE 250 W/ 637 OVERRIDE(OP): Performed by: EMERGENCY MEDICINE

## 2020-09-21 PROCEDURE — 99284 EMERGENCY DEPT VISIT MOD MDM: CPT

## 2020-09-21 PROCEDURE — 71045 X-RAY EXAM CHEST 1 VIEW: CPT

## 2020-09-21 PROCEDURE — 84484 ASSAY OF TROPONIN QUANT: CPT

## 2020-09-21 PROCEDURE — 700111 HCHG RX REV CODE 636 W/ 250 OVERRIDE (IP)

## 2020-09-21 PROCEDURE — 85025 COMPLETE CBC W/AUTO DIFF WBC: CPT

## 2020-09-21 PROCEDURE — 93005 ELECTROCARDIOGRAM TRACING: CPT

## 2020-09-21 PROCEDURE — 80053 COMPREHEN METABOLIC PANEL: CPT

## 2020-09-21 PROCEDURE — 36415 COLL VENOUS BLD VENIPUNCTURE: CPT

## 2020-09-21 PROCEDURE — 96372 THER/PROPH/DIAG INJ SC/IM: CPT

## 2020-09-21 RX ORDER — FAMOTIDINE 20 MG/1
20 TABLET, FILM COATED ORAL ONCE
Status: COMPLETED | OUTPATIENT
Start: 2020-09-21 | End: 2020-09-21

## 2020-09-21 RX ORDER — NAPROXEN 500 MG/1
500 TABLET ORAL 2 TIMES DAILY WITH MEALS
Qty: 60 TAB | Refills: 0 | Status: SHIPPED | OUTPATIENT
Start: 2020-09-21 | End: 2020-09-28

## 2020-09-21 RX ORDER — FAMOTIDINE 40 MG/1
20 TABLET, FILM COATED ORAL DAILY
Qty: 30 TAB | Refills: 0 | Status: SHIPPED | OUTPATIENT
Start: 2020-09-21 | End: 2020-11-20

## 2020-09-21 RX ORDER — KETOROLAC TROMETHAMINE 30 MG/ML
30 INJECTION, SOLUTION INTRAMUSCULAR; INTRAVENOUS ONCE
Status: COMPLETED | OUTPATIENT
Start: 2020-09-21 | End: 2020-09-21

## 2020-09-21 RX ORDER — KETOROLAC TROMETHAMINE 30 MG/ML
30 INJECTION, SOLUTION INTRAMUSCULAR; INTRAVENOUS ONCE
Status: DISCONTINUED | OUTPATIENT
Start: 2020-09-21 | End: 2020-09-21

## 2020-09-21 RX ORDER — KETOROLAC TROMETHAMINE 30 MG/ML
15 INJECTION, SOLUTION INTRAMUSCULAR; INTRAVENOUS ONCE
Status: DISCONTINUED | OUTPATIENT
Start: 2020-09-21 | End: 2020-09-21

## 2020-09-21 RX ADMIN — FAMOTIDINE 20 MG: 20 TABLET, FILM COATED ORAL at 19:25

## 2020-09-21 RX ADMIN — KETOROLAC TROMETHAMINE 30 MG: 30 INJECTION, SOLUTION INTRAMUSCULAR at 19:25

## 2020-09-21 ASSESSMENT — PAIN DESCRIPTION - DESCRIPTORS: DESCRIPTORS: ACHING

## 2020-09-21 ASSESSMENT — FIBROSIS 4 INDEX: FIB4 SCORE: 0.52

## 2020-09-22 NOTE — ED PROVIDER NOTES
ED Provider Note    Scribed for Sawyer Cuevas M.D. by Kenney Siddiqi. 9/21/2020,  6:52 PM.    CHIEF COMPLAINT  Chief Complaint   Patient presents with   • Chest Pain       HPI  Sridhar Underwood is a 35 y.o. male with a history of GERD who presents to the Emergency Department for chest pain onset 3 days ago. Patient notes he is experiencing pain in his neck and jaw as well. Patient adds his pain is exacerbated by smoking cigarettes. Patient states he has seen a GI doctor which he was recommended when he was last seen at [facility].  He has chronic GERD, and he says omeprazole is no longer helping.  He has trouble distinguishing this chest pain from GERD, and with respect to the chest pain, there is no exacerbating or relieving factors that he notes, no exertional component, no orthopnea, and has had no vomiting or diaphoresis.  Patient adds he needs help getting a primary care physician. 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  Patient denies any fevers or chills.  He has not had cough or shortness of breath or hemoptysis or pleuritic pain.    PPE Note: I personally donned full PPE for all patient encounters during this visit, including being clean-shaven with a mask.   Scribe remained outside the patient's room and did not have any contact with the patient for the duration of patient encounter.     REVIEW OF SYSTEMS  See HPI for further details.    PAST MEDICAL HISTORY   has a past medical history of GERD (gastroesophageal reflux disease).    SOCIAL HISTORY  Social History     Tobacco Use   • Smoking status: Current Every Day Smoker     Packs/day: 0.25     Years: 20.00     Pack years: 5.00     Types: Cigarettes   • Smokeless tobacco: Never Used   Substance and Sexual Activity   • Alcohol use: Yes     Frequency: Monthly or less     Drinks per session: 1 or 2     Binge frequency: Never   • Drug use: Yes     Types: Inhaled     Comment:  "marijuana   • Sexual activity: None noted     Social History     Substance and Sexual Activity   Drug Use Yes   • Types: Inhaled    Comment: marijuana       SURGICAL HISTORY   has a past surgical history that includes appendectomy.    CURRENT MEDICATIONS  Home Medications     Reviewed by Raman Diane R.N. (Registered Nurse) on 20 at 1701  Med List Status: Partial   Medication Last Dose Status   omeprazole (PRILOSEC) 20 MG delayed-release capsule  Active                ALLERGIES  No Known Allergies    PHYSICAL EXAM  VITAL SIGNS: /85   Pulse 82   Temp 37.1 °C (98.8 °F) (Temporal)   Resp 16   Ht 1.676 m (5' 6\")   Wt 98.6 kg (217 lb 6 oz)   SpO2 95%   BMI 35.09 kg/m²   Pulse ox interpretation: I interpret this pulse ox as normal.  Constitutional: Alert in no apparent distress.  HENT: No signs of trauma, Bilateral external ears normal, Nose normal.   Eyes: Pupils are equal and reactive, Conjunctiva normal, Non-icteric.   Neck: Normal range of motion, Supple, No stridor.    Cardiovascular: Normal peripheral perfusion  Thorax & Lungs: Reproducible sternal border tenderness bilaterally, Unlabored respirations, equal chest expansion, no accessory muscle use  Abdomen: Non-distended  Skin:  No erythema, No rash.   Back: Normal alignment and ROM  Extremities: No gross deformity  Musculoskeletal: Good range of motion in all major joints.   Neurologic: Alert, Normal motor function, No focal deficits noted.   Psychiatric: Affect normal, Judgment normal, Mood normal.    DIAGNOSTIC STUDIES / PROCEDURES    Results for orders placed or performed during the hospital encounter of 20   EKG (NOW)   Result Value Ref Range    Report       [facility] Emergency Dept.    Test Date:  2020  Pt Name:    DOC BOWLES                 Department: ER  MRN:        [id number]                      Room:  Gender:     Male                         Technician: [id number]  :        1985              "      Requested By:ER TRIAGE PROTOCOL  Order #:    [id number]                    Reading MD: PITER RODRIGUEZ MD    Measurements  Intervals                                Axis  Rate:       92                           P:          38  KS:         182                          QRS:        41  QRSD:       86                           T:          20  QT:         338  QTc:        419    Interpretive Statements  Sinus rhythm  ST elev, probable normal early repol pattern  Compared to ECG 04/04/2020 12:50:59  No significant changes  Electronically Signed On 9- 18:52:50 PDT by PITER RODRIGUEZ MD       LABS  Labs Reviewed   COMP METABOLIC PANEL - Abnormal; Notable for the following components:       Result Value    Glucose 110 (*)     All other components within normal limits   CBC WITH DIFFERENTIAL   TROPONIN   ESTIMATED GFR     All labs reviewed by me.    RADIOLOGY  DX-CHEST-PORTABLE (1 VIEW)   Final Result      No radiographic evidence of acute cardiopulmonary process.      DX-CHEST-LIMITED (1 VIEW)    (Results Pending)     The radiologist's interpretation of all radiological studies have been reviewed by me.    COURSE & MEDICAL DECISION MAKING  Nursing notes, VS, PMSFHx reviewed in chart.   Review of patient's chart shows patient has visited [facility] 3 times in the last 6 months for chest pain.     6:52 PM Patient seen and examined at bedside. He's been seen here twice before for what he agrees is the same type of pain. These workups were normally. Differential diagnosis includes but is not limited to ACS seems very unlikely given repeat presentations for same symptoms, normal troponin, and normal EKG; Costochondritis and other musculoskeletal chest pain, less likely pneumonia are all possibilities. Reflux symptoms also possible. Ordered for DX-chest limited, DX-chest portable, Estimated GFR, CBC with differential, CMP, and troponin to evaluate. Patient will be treated with Toradol injection 15 mg and Pepcid 20 mg  for his symptoms.     8:05 PM Patient was reevaluated at bedside. Discussed lab and radiology results with the patient and informed them that they are to be discharged with Pepcid 40 mg. Patient verbalized his understanding and agreement with the plan of discharge.      The patient will return for new or worsening symptoms and is stable at the time of discharge.    The patient is referred to a primary physician for blood pressure management, diabetic screening, and for all other preventative health concerns.    DISPOSITION:  Patient will be discharged home in improved condition.    FOLLOW UP:  [address] [address]        [place] [id number] [facility] [address]  [phone]        [address] [address]  [phone]        [facility] [address]  [phone]    Schedule an appointment as soon as possible for a visit         OUTPATIENT MEDICATIONS:  New Prescriptions    FAMOTIDINE (PEPCID) 40 MG TAB    Take 0.5 Tabs by mouth every day for 60 days.         FINAL IMPRESSION  1. Costochondritis         Kenney CANSECO ([place]), am scribing for, and in the presence of, Sawyer Cuevas M.D..    Electronically signed by: Kenney Siddiqi ([place]), 9/21/2020    Sawyer CANSECO M.D. personally performed the services described in this documentation, as scribed by Kenney Siddiqi in my presence, and it is both accurate and complete. C    The note accurately reflects work and decisions made by me.  Sawyer Cuevas M.D.  9/21/2020  8:41 PM

## 2020-09-22 NOTE — ED NOTES
DC PAPERS AND SCRIPTS PROVIDED TO PT. ALL QUESTIONS ADDRESSED AT THIS TIME.PT AMB OFF UNIT WITH STEADY GAIT.

## 2020-09-22 NOTE — ED TRIAGE NOTES
"Sridhar Underwood  Chief Complaint   Patient presents with   • Chest Pain     Pt ambulatory to triage with above complaint. EKG completed in triage. Pt reports mid-sternal CP started approx 3 days ago. Pt reports having to leave work today d/t pain. Pt reports only medical hx of GERD and takes omeprazole daily. Pt does states CP does radiate sometimes to bilateral armpits. Pt denies any SOB at this time. NAD noted, VSS.   Pt and staff wearing appropriate PPE during triage. Pt denies any recent travel or close contact with anyone suspected or confirmed of having COVID-19.     /99   Pulse 95   Temp 37.1 °C (98.8 °F) (Temporal)   Resp 16   Ht 1.676 m (5' 6\")   Wt 98.6 kg (217 lb 6 oz)   SpO2 98%   BMI 35.09 kg/m²     Pt informed of triage process and encouraged to notify staff of any changes or concerns. Pt verbalized understanding of instructions. Apologized for long wait time. Pt placed back in lobby.     "

## 2020-12-02 ENCOUNTER — APPOINTMENT (OUTPATIENT)
Dept: RADIOLOGY | Facility: MEDICAL CENTER | Age: 35
End: 2020-12-02
Attending: EMERGENCY MEDICINE
Payer: COMMERCIAL

## 2020-12-02 ENCOUNTER — HOSPITAL ENCOUNTER (EMERGENCY)
Facility: MEDICAL CENTER | Age: 35
End: 2020-12-03
Attending: EMERGENCY MEDICINE
Payer: COMMERCIAL

## 2020-12-02 VITALS
WEIGHT: 224.21 LBS | SYSTOLIC BLOOD PRESSURE: 141 MMHG | HEIGHT: 65 IN | DIASTOLIC BLOOD PRESSURE: 81 MMHG | OXYGEN SATURATION: 93 % | TEMPERATURE: 97.5 F | BODY MASS INDEX: 37.36 KG/M2 | RESPIRATION RATE: 18 BRPM | HEART RATE: 79 BPM

## 2020-12-02 DIAGNOSIS — R10.31 RIGHT LOWER QUADRANT ABDOMINAL PAIN: ICD-10-CM

## 2020-12-02 DIAGNOSIS — N50.812 PAIN IN LEFT TESTICLE: ICD-10-CM

## 2020-12-02 LAB
ALBUMIN SERPL BCP-MCNC: 3.8 G/DL (ref 3.2–4.9)
ALBUMIN/GLOB SERPL: 1.1 G/DL
ALP SERPL-CCNC: 74 U/L (ref 30–99)
ALT SERPL-CCNC: 28 U/L (ref 2–50)
ANION GAP SERPL CALC-SCNC: 13 MMOL/L (ref 7–16)
APPEARANCE UR: CLEAR
AST SERPL-CCNC: 15 U/L (ref 12–45)
BASOPHILS # BLD AUTO: 0.6 % (ref 0–1.8)
BASOPHILS # BLD: 0.07 K/UL (ref 0–0.12)
BILIRUB SERPL-MCNC: 0.2 MG/DL (ref 0.1–1.5)
BILIRUB UR QL STRIP.AUTO: NEGATIVE
BUN SERPL-MCNC: 11 MG/DL (ref 8–22)
CALCIUM SERPL-MCNC: 9.5 MG/DL (ref 8.4–10.2)
CHLORIDE SERPL-SCNC: 104 MMOL/L (ref 96–112)
CO2 SERPL-SCNC: 22 MMOL/L (ref 20–33)
COLOR UR: YELLOW
CREAT SERPL-MCNC: 0.66 MG/DL (ref 0.5–1.4)
EOSINOPHIL # BLD AUTO: 0.27 K/UL (ref 0–0.51)
EOSINOPHIL NFR BLD: 2.4 % (ref 0–6.9)
ERYTHROCYTE [DISTWIDTH] IN BLOOD BY AUTOMATED COUNT: 42.5 FL (ref 35.9–50)
GLOBULIN SER CALC-MCNC: 3.4 G/DL (ref 1.9–3.5)
GLUCOSE SERPL-MCNC: 129 MG/DL (ref 65–99)
GLUCOSE UR STRIP.AUTO-MCNC: NEGATIVE MG/DL
HCT VFR BLD AUTO: 42.2 % (ref 42–52)
HGB BLD-MCNC: 14.5 G/DL (ref 14–18)
IMM GRANULOCYTES # BLD AUTO: 0.04 K/UL (ref 0–0.11)
IMM GRANULOCYTES NFR BLD AUTO: 0.4 % (ref 0–0.9)
KETONES UR STRIP.AUTO-MCNC: NEGATIVE MG/DL
LEUKOCYTE ESTERASE UR QL STRIP.AUTO: NEGATIVE
LIPASE SERPL-CCNC: 26 U/L (ref 7–58)
LYMPHOCYTES # BLD AUTO: 3.22 K/UL (ref 1–4.8)
LYMPHOCYTES NFR BLD: 28.3 % (ref 22–41)
MCH RBC QN AUTO: 31.2 PG (ref 27–33)
MCHC RBC AUTO-ENTMCNC: 34.4 G/DL (ref 33.7–35.3)
MCV RBC AUTO: 90.8 FL (ref 81.4–97.8)
MICRO URNS: NORMAL
MONOCYTES # BLD AUTO: 0.88 K/UL (ref 0–0.85)
MONOCYTES NFR BLD AUTO: 7.7 % (ref 0–13.4)
NEUTROPHILS # BLD AUTO: 6.91 K/UL (ref 1.82–7.42)
NEUTROPHILS NFR BLD: 60.6 % (ref 44–72)
NITRITE UR QL STRIP.AUTO: NEGATIVE
NRBC # BLD AUTO: 0 K/UL
NRBC BLD-RTO: 0 /100 WBC
PH UR STRIP.AUTO: 6.5 [PH] (ref 5–8)
PLATELET # BLD AUTO: 228 K/UL (ref 164–446)
PMV BLD AUTO: 10.7 FL (ref 9–12.9)
POTASSIUM SERPL-SCNC: 3.4 MMOL/L (ref 3.6–5.5)
PROT SERPL-MCNC: 7.2 G/DL (ref 6–8.2)
PROT UR QL STRIP: NEGATIVE MG/DL
RBC # BLD AUTO: 4.65 M/UL (ref 4.7–6.1)
RBC UR QL AUTO: NEGATIVE
SODIUM SERPL-SCNC: 139 MMOL/L (ref 135–145)
SP GR UR STRIP.AUTO: 1.02
WBC # BLD AUTO: 11.4 K/UL (ref 4.8–10.8)

## 2020-12-02 PROCEDURE — 80053 COMPREHEN METABOLIC PANEL: CPT

## 2020-12-02 PROCEDURE — 85025 COMPLETE CBC W/AUTO DIFF WBC: CPT

## 2020-12-02 PROCEDURE — 36415 COLL VENOUS BLD VENIPUNCTURE: CPT

## 2020-12-02 PROCEDURE — 99283 EMERGENCY DEPT VISIT LOW MDM: CPT

## 2020-12-02 PROCEDURE — 74176 CT ABD & PELVIS W/O CONTRAST: CPT

## 2020-12-02 PROCEDURE — 96372 THER/PROPH/DIAG INJ SC/IM: CPT

## 2020-12-02 PROCEDURE — 81003 URINALYSIS AUTO W/O SCOPE: CPT

## 2020-12-02 PROCEDURE — 700111 HCHG RX REV CODE 636 W/ 250 OVERRIDE (IP): Performed by: EMERGENCY MEDICINE

## 2020-12-02 PROCEDURE — 700102 HCHG RX REV CODE 250 W/ 637 OVERRIDE(OP): Performed by: EMERGENCY MEDICINE

## 2020-12-02 PROCEDURE — 83690 ASSAY OF LIPASE: CPT

## 2020-12-02 PROCEDURE — A9270 NON-COVERED ITEM OR SERVICE: HCPCS | Performed by: EMERGENCY MEDICINE

## 2020-12-02 RX ORDER — ACETAMINOPHEN 325 MG/1
650 TABLET ORAL ONCE
Status: COMPLETED | OUTPATIENT
Start: 2020-12-02 | End: 2020-12-02

## 2020-12-02 RX ORDER — KETOROLAC TROMETHAMINE 30 MG/ML
30 INJECTION, SOLUTION INTRAMUSCULAR; INTRAVENOUS ONCE
Status: COMPLETED | OUTPATIENT
Start: 2020-12-02 | End: 2020-12-02

## 2020-12-02 RX ADMIN — KETOROLAC TROMETHAMINE 30 MG: 30 INJECTION, SOLUTION INTRAMUSCULAR; INTRAVENOUS at 23:44

## 2020-12-02 RX ADMIN — ACETAMINOPHEN 650 MG: 325 TABLET, FILM COATED ORAL at 23:43

## 2020-12-02 ASSESSMENT — FIBROSIS 4 INDEX: FIB4 SCORE: 0.52

## 2020-12-02 NOTE — LETTER
Karo Melara accompanied Sridhar Underwood to the emergency department on 12/2/2020 - 12/3/2020. Please excuse Karo from work on 12/3/2020 and they are allowed to return to work on 12/4/2020     If you have any questions or concerns, please don't hesitate to call.      Matthew Santana RN

## 2020-12-03 NOTE — ED TRIAGE NOTES
Pt reports lower back pain x 3-4 days that is increasing and worse on the R side/flank area. Also complaining of L testicle pain and a headache. Abd pain present but has upcoming appt with GI.    Pt and visitor deny known covid exposure or symptoms.

## 2020-12-03 NOTE — ED NOTES
Pt discharged home per provider in stable condition. Paperwork provided to pt via RN and discharge instructions went over with by RN - pt verbalized understanding of teaching with no questions or concerns and is A/Ox4, VSS. Paperwork in hand on discharge.    Paperwork given to pt and verbalized understanding. RN went over paperwork. Work notes provided to pt and significant other for x1 day, all belongings are in possession of patient at time of discharge. Ambulatory out. CT results sent with patient.

## 2020-12-03 NOTE — ED PROVIDER NOTES
"ED Provider Note    CHIEF COMPLAINT  Chief Complaint   Patient presents with   • Flank Pain   • Testicular Pain       HPI  Sridhar Underwood is a 35 y.o. male who presents with a report of left-sided testicular pain and right-sided flank pain and right lower quadrant pain over the course of the past 5 days.  Denies any fever or vomiting and denies diarrhea.  No cough or derangement of smell or taste and no headache.  He has had similar episode like this about 4 or 5 times in the past.  Surgical history includes appendectomy but still has gallbladder and does have a history of kidney stones.  States that his pain is constant and unremitting no alleviating factors and moderate in severity    REVIEW OF SYSTEMS  See HPI for further details. All other systems are negative.     PAST MEDICAL HISTORY  Past Medical History:   Diagnosis Date   • GERD (gastroesophageal reflux disease)        FAMILY HISTORY  History reviewed. No pertinent family history.    SOCIAL HISTORY   reports that he has been smoking cigarettes. He has a 5.00 pack-year smoking history. He has never used smokeless tobacco. He reports current alcohol use. He reports current drug use. Drug: Inhaled.    SURGICAL HISTORY  Past Surgical History:   Procedure Laterality Date   • APPENDECTOMY         CURRENT MEDICATIONS  Home Medications     Reviewed by Ana Damon R.N. (Registered Nurse) on 12/02/20 at 2037  Med List Status: Partial   Medication Last Dose Status        Patient Ashok Taking any Medications                       ALLERGIES  No Known Allergies    PHYSICAL EXAM  VITAL SIGNS: /81   Pulse 79   Temp 36.4 °C (97.5 °F) (Temporal)   Resp 18   Ht 1.651 m (5' 5\")   Wt 101.7 kg (224 lb 3.3 oz)   SpO2 93%   BMI 37.31 kg/m²    Constitutional: Well developed, Well nourished, No acute distress, Non-toxic appearance.   HENT: Normocephalic, Atraumatic, Bilateral external ears normal, Oropharynx is clear mucous membranes are moist. No oral " exudates or nasal discharge.   Eyes: Pupils are equal round and reactive, EOMI, Conjunctiva normal, No discharge.   Neck: Normal range of motion, No tenderness, Supple, No stridor. No meningismus.  Lymphatic: No lymphadenopathy noted.   Cardiovascular: Regular rate and rhythm without murmur rub or gallop.  Thorax & Lungs: Clear breath sounds bilaterally without wheezes, rhonchi or rales. There is no chest wall tenderness.   Abdomen: Soft with tenderness in the right flank and right lower quadrant, equivocal Swenson sign, abdomen is otherwise non-distended. There is no rebound or guarding. No organomegaly is appreciated. Bowel sounds are normal.  Skin: Normal without rash.   Back: No CVA or spinal tenderness.   Extremities: Intact distal pulses, No edema, No tenderness, No cyanosis, No clubbing. Capillary refill is less than 2 seconds.  Musculoskeletal: Good range of motion in all major joints. No tenderness to palpation or major deformities noted.   Neurologic: Alert & oriented x 3, Normal motor function, Normal sensory function, No focal deficits noted. Reflexes are normal.  Psychiatric: Affect normal, Judgment normal, Mood normal. There is no suicidal ideation or patient reported hallucinations.       RADIOLOGY/PROCEDURES  CT-RENAL COLIC EVALUATION(A/P W/O)   Final Result         1.  Left nephrolithiasis without visualized obstructive changes.   2.  Changes of hepatomegaly and hepatic steatosis.   3.  Small fat-containing left internal hernia   4.  Right middle lobe pulmonary nodule, see nodule follow-up recommendations below.      Fleischner Society pulmonary nodule recommendations:      Low Risk: No routine follow-up      High Risk: Optional CT at 12 months      Comments: Nodules less than 6 mm do not require routine follow-up, but certain patients at high risk with suspicious nodule morphology, upper lobe location, or both may warrant 12-month follow-up.      Low Risk - Minimal or absent history of smoking and  of other known risk factors.      High Risk - History of smoking or of other known risk factors.      Note: These recommendations do not apply to lung cancer screening, patients with immunosuppression, or patients with known primary cancer.      Fleischner Society 2017 Guidelines for Management of Incidentally Detected Pulmonary Nodules in Adults               COURSE & MEDICAL DECISION MAKING  Pertinent Labs & Imaging studies reviewed. (See chart for details)  Patient's presentation was not clear-cut and I did obtain lab work that shows no evidence of leukocytosis, shift, anemia or electrolyte derangements.  Urinalysis does not show any evidence of infection.  Initially we had ordered an ultrasound per protocol however I canceled this as his abdominal discomfort was way more significant and left testicular discomfort and timeline did not fit torsion    CAT scan shows pulmonary nodule incidentally and changes of hepatomegaly and hepatic steatosis.  I recommend follow-up for this patient who does not have a defined reason for his acute abdominal discomfort over the last 4 to 5 days and he is encouraged to return if she has vomiting, fever, significant change    Patient is discharged in stable condition after receiving Toradol and Tylenol improved status will return if any significant change in symptoms    FINAL IMPRESSION  1. Right lower quadrant abdominal pain    2. Pain in left testicle             Electronically signed by: Sergei Manzano M.D., 12/2/2020 11:28 PM

## 2021-07-19 ENCOUNTER — HOSPITAL ENCOUNTER (OUTPATIENT)
Dept: RADIOLOGY | Facility: MEDICAL CENTER | Age: 36
End: 2021-07-19
Attending: NURSE PRACTITIONER
Payer: COMMERCIAL

## 2021-07-19 DIAGNOSIS — R10.11 RUQ ABDOMINAL PAIN: ICD-10-CM

## 2021-07-19 PROCEDURE — 76700 US EXAM ABDOM COMPLETE: CPT

## 2021-10-12 ENCOUNTER — HOSPITAL ENCOUNTER (EMERGENCY)
Facility: MEDICAL CENTER | Age: 36
End: 2021-10-12
Attending: EMERGENCY MEDICINE
Payer: COMMERCIAL

## 2021-10-12 ENCOUNTER — APPOINTMENT (OUTPATIENT)
Dept: RADIOLOGY | Facility: MEDICAL CENTER | Age: 36
End: 2021-10-12
Attending: EMERGENCY MEDICINE
Payer: COMMERCIAL

## 2021-10-12 VITALS
BODY MASS INDEX: 38.37 KG/M2 | HEART RATE: 61 BPM | WEIGHT: 238.76 LBS | OXYGEN SATURATION: 97 % | HEIGHT: 66 IN | DIASTOLIC BLOOD PRESSURE: 69 MMHG | TEMPERATURE: 96.5 F | RESPIRATION RATE: 18 BRPM | SYSTOLIC BLOOD PRESSURE: 143 MMHG

## 2021-10-12 DIAGNOSIS — N23 URETERAL COLIC: ICD-10-CM

## 2021-10-12 DIAGNOSIS — N20.0 NEPHROLITHIASIS: ICD-10-CM

## 2021-10-12 DIAGNOSIS — R10.32 LEFT LOWER QUADRANT PAIN: ICD-10-CM

## 2021-10-12 LAB
ALBUMIN SERPL BCP-MCNC: 4.1 G/DL (ref 3.2–4.9)
ALBUMIN/GLOB SERPL: 1.5 G/DL
ALP SERPL-CCNC: 72 U/L (ref 30–99)
ALT SERPL-CCNC: 54 U/L (ref 2–50)
ANION GAP SERPL CALC-SCNC: 14 MMOL/L (ref 7–16)
APPEARANCE UR: CLEAR
AST SERPL-CCNC: 33 U/L (ref 12–45)
BASOPHILS # BLD AUTO: 0.7 % (ref 0–1.8)
BASOPHILS # BLD: 0.06 K/UL (ref 0–0.12)
BILIRUB SERPL-MCNC: 0.2 MG/DL (ref 0.1–1.5)
BILIRUB UR QL STRIP.AUTO: NEGATIVE
BUN SERPL-MCNC: 14 MG/DL (ref 8–22)
CALCIUM SERPL-MCNC: 9.2 MG/DL (ref 8.5–10.5)
CHLORIDE SERPL-SCNC: 105 MMOL/L (ref 96–112)
CO2 SERPL-SCNC: 18 MMOL/L (ref 20–33)
COLOR UR: YELLOW
CREAT SERPL-MCNC: 0.89 MG/DL (ref 0.5–1.4)
EOSINOPHIL # BLD AUTO: 0.24 K/UL (ref 0–0.51)
EOSINOPHIL NFR BLD: 2.7 % (ref 0–6.9)
ERYTHROCYTE [DISTWIDTH] IN BLOOD BY AUTOMATED COUNT: 42.6 FL (ref 35.9–50)
GLOBULIN SER CALC-MCNC: 2.8 G/DL (ref 1.9–3.5)
GLUCOSE SERPL-MCNC: 127 MG/DL (ref 65–99)
GLUCOSE UR STRIP.AUTO-MCNC: NEGATIVE MG/DL
HCT VFR BLD AUTO: 42.3 % (ref 42–52)
HGB BLD-MCNC: 14.5 G/DL (ref 14–18)
IMM GRANULOCYTES # BLD AUTO: 0.06 K/UL (ref 0–0.11)
IMM GRANULOCYTES NFR BLD AUTO: 0.7 % (ref 0–0.9)
KETONES UR STRIP.AUTO-MCNC: NEGATIVE MG/DL
LEUKOCYTE ESTERASE UR QL STRIP.AUTO: NEGATIVE
LIPASE SERPL-CCNC: 25 U/L (ref 11–82)
LYMPHOCYTES # BLD AUTO: 2.31 K/UL (ref 1–4.8)
LYMPHOCYTES NFR BLD: 25.9 % (ref 22–41)
MCH RBC QN AUTO: 30.1 PG (ref 27–33)
MCHC RBC AUTO-ENTMCNC: 34.3 G/DL (ref 33.7–35.3)
MCV RBC AUTO: 87.9 FL (ref 81.4–97.8)
MICRO URNS: NORMAL
MONOCYTES # BLD AUTO: 0.76 K/UL (ref 0–0.85)
MONOCYTES NFR BLD AUTO: 8.5 % (ref 0–13.4)
NEUTROPHILS # BLD AUTO: 5.49 K/UL (ref 1.82–7.42)
NEUTROPHILS NFR BLD: 61.5 % (ref 44–72)
NITRITE UR QL STRIP.AUTO: NEGATIVE
NRBC # BLD AUTO: 0 K/UL
NRBC BLD-RTO: 0 /100 WBC
PH UR STRIP.AUTO: 6 [PH] (ref 5–8)
PLATELET # BLD AUTO: 215 K/UL (ref 164–446)
PMV BLD AUTO: 10.3 FL (ref 9–12.9)
POTASSIUM SERPL-SCNC: 4.4 MMOL/L (ref 3.6–5.5)
PROT SERPL-MCNC: 6.9 G/DL (ref 6–8.2)
PROT UR QL STRIP: NEGATIVE MG/DL
RBC # BLD AUTO: 4.81 M/UL (ref 4.7–6.1)
RBC UR QL AUTO: NEGATIVE
SODIUM SERPL-SCNC: 137 MMOL/L (ref 135–145)
SP GR UR STRIP.AUTO: 1.02
UROBILINOGEN UR STRIP.AUTO-MCNC: 0.2 MG/DL
WBC # BLD AUTO: 8.9 K/UL (ref 4.8–10.8)

## 2021-10-12 PROCEDURE — 99284 EMERGENCY DEPT VISIT MOD MDM: CPT

## 2021-10-12 PROCEDURE — 74176 CT ABD & PELVIS W/O CONTRAST: CPT

## 2021-10-12 PROCEDURE — 700111 HCHG RX REV CODE 636 W/ 250 OVERRIDE (IP): Performed by: EMERGENCY MEDICINE

## 2021-10-12 PROCEDURE — 80053 COMPREHEN METABOLIC PANEL: CPT

## 2021-10-12 PROCEDURE — 96374 THER/PROPH/DIAG INJ IV PUSH: CPT

## 2021-10-12 PROCEDURE — 96375 TX/PRO/DX INJ NEW DRUG ADDON: CPT

## 2021-10-12 PROCEDURE — 83690 ASSAY OF LIPASE: CPT

## 2021-10-12 PROCEDURE — 85025 COMPLETE CBC W/AUTO DIFF WBC: CPT

## 2021-10-12 PROCEDURE — 81003 URINALYSIS AUTO W/O SCOPE: CPT

## 2021-10-12 RX ORDER — KETOROLAC TROMETHAMINE 10 MG/1
10 TABLET, FILM COATED ORAL EVERY 6 HOURS PRN
Qty: 20 TABLET | Refills: 0 | Status: SHIPPED | OUTPATIENT
Start: 2021-10-12 | End: 2022-08-19

## 2021-10-12 RX ORDER — ONDANSETRON 2 MG/ML
4 INJECTION INTRAMUSCULAR; INTRAVENOUS ONCE
Status: COMPLETED | OUTPATIENT
Start: 2021-10-12 | End: 2021-10-12

## 2021-10-12 RX ORDER — KETOROLAC TROMETHAMINE 30 MG/ML
30 INJECTION, SOLUTION INTRAMUSCULAR; INTRAVENOUS ONCE
Status: COMPLETED | OUTPATIENT
Start: 2021-10-12 | End: 2021-10-12

## 2021-10-12 RX ORDER — TAMSULOSIN HYDROCHLORIDE 0.4 MG/1
0.4 CAPSULE ORAL DAILY
Qty: 4 CAPSULE | Refills: 0 | Status: SHIPPED | OUTPATIENT
Start: 2021-10-12 | End: 2021-10-16

## 2021-10-12 RX ORDER — ONDANSETRON 4 MG/1
4 TABLET, ORALLY DISINTEGRATING ORAL EVERY 4 HOURS PRN
Qty: 10 TABLET | Refills: 0 | Status: SHIPPED | OUTPATIENT
Start: 2021-10-12 | End: 2022-04-04 | Stop reason: SDUPTHER

## 2021-10-12 RX ADMIN — KETOROLAC TROMETHAMINE 30 MG: 30 INJECTION, SOLUTION INTRAMUSCULAR; INTRAVENOUS at 06:54

## 2021-10-12 RX ADMIN — ONDANSETRON 4 MG: 2 INJECTION INTRAMUSCULAR; INTRAVENOUS at 06:54

## 2021-10-12 ASSESSMENT — FIBROSIS 4 INDEX: FIB4 SCORE: 0.44

## 2021-10-12 ASSESSMENT — LIFESTYLE VARIABLES: DO YOU DRINK ALCOHOL: NO

## 2021-10-12 NOTE — ED TRIAGE NOTES
"Chief Complaint   Patient presents with   • Abdominal Pain     Pt complains of intermittent \"shooting\" left ab pain and nausea x 3 days. Hx GERD        Pt amb to triage with steady gait for above complaint.   Pt is alert and oriented, speaking in full sentences, follows commands and responds appropriately to questions. Resp are even and unlabored. No obvious acute distress.    Pt placed in lobby. Pt educated on triage process. Pt encouraged to alert staff for any changes.    Vitals:    10/12/21 0557   BP: 137/82   Pulse: 66   Resp: 20   Temp: 35.9 °C (96.6 °F)   SpO2: 97%       "

## 2021-10-12 NOTE — ED PROVIDER NOTES
"ED Provider Note    CHIEF COMPLAINT  Chief Complaint   Patient presents with   • Abdominal Pain     Pt complains of intermittent \"shooting\" left ab pain and nausea x 3 days. Hx GERD        HPI  Sridhar Underwood is a 36 y.o. male who presents to the emergency department through triage with left lower quadrant abdominal pain.  Patient with some intermittent discomfort and nausea for 3 days, but intractable left lower quadrant abdominal pain, sharp, constant since last night.  15 out of 10, but improving since arrival.  Nausea, vomiting.  Normal bowel movements.  No hematuria, dysuria or frequency.    Distant history for kidney stone, but states his pain is worse.    REVIEW OF SYSTEMS  See HPI for further details. All other systems are negative.     PAST MEDICAL HISTORY   has a past medical history of GERD (gastroesophageal reflux disease).    SOCIAL HISTORY  Social History     Tobacco Use   • Smoking status: Current Every Day Smoker     Packs/day: 0.25     Years: 20.00     Pack years: 5.00     Types: Cigarettes   • Smokeless tobacco: Never Used   Vaping Use   • Vaping Use: Never used   Substance and Sexual Activity   • Alcohol use: Yes   • Drug use: Yes     Types: Inhaled     Comment: marijuana   • Sexual activity: Not on file       SURGICAL HISTORY   has a past surgical history that includes appendectomy.    CURRENT MEDICATIONS  Home Medications     Reviewed by Aaliyah Park R.N. (Registered Nurse) on 10/12/21 at 0603  Med List Status: <None>   Medication Last Dose Status        Patient Ashok Taking any Medications                       ALLERGIES  No Known Allergies    PHYSICAL EXAM  VITAL SIGNS: /82   Pulse 63   Temp 35.9 °C (96.6 °F) (Temporal)   Resp 20   Ht 1.676 m (5' 6\")   Wt 108 kg (238 lb 12.1 oz)   SpO2 95%   BMI 38.54 kg/m²   Pulse ox interpretation: I interpret this pulse ox as normal.  Constitutional: Alert in no apparent distress.  HENT: Normocephalic, atraumatic. Bilateral external " ears normal, Nose normal.  Eyes: Pupils are equal and reactive, Conjunctiva normal.   Neck: Normal range of motion, Supple  Lymphatic: No lymphadenopathy noted.  No inguinal mass or lymphadenopathy.  Cardiovascular: Normal peripheral perfusion.  Thorax & Lungs: Nonlabored respirations.  Abdomen: Soft, non-distended.  Mild discomfort with palpation left lower quadrant without rebound, guarding or peritonitis.  Mild left CVA discomfort with percussion.  No inguinal mass or hernia.  Skin: Warm, Dry, No erythema, No rash.   Musculoskeletal: Good range of motion in all major joints.   Neurologic: Aler oriented x4.  Speech clear and cohesive.  Moves 4 extremity spontaneously.  Psychiatric: Affect normal, Judgment normal, Mood normal.       DIAGNOSTIC STUDIES / PROCEDURES  LABS  Results for orders placed or performed during the hospital encounter of 10/12/21   CBC WITH DIFFERENTIAL   Result Value Ref Range    WBC 8.9 4.8 - 10.8 K/uL    RBC 4.81 4.70 - 6.10 M/uL    Hemoglobin 14.5 14.0 - 18.0 g/dL    Hematocrit 42.3 42.0 - 52.0 %    MCV 87.9 81.4 - 97.8 fL    MCH 30.1 27.0 - 33.0 pg    MCHC 34.3 33.7 - 35.3 g/dL    RDW 42.6 35.9 - 50.0 fL    Platelet Count 215 164 - 446 K/uL    MPV 10.3 9.0 - 12.9 fL    Neutrophils-Polys 61.50 44.00 - 72.00 %    Lymphocytes 25.90 22.00 - 41.00 %    Monocytes 8.50 0.00 - 13.40 %    Eosinophils 2.70 0.00 - 6.90 %    Basophils 0.70 0.00 - 1.80 %    Immature Granulocytes 0.70 0.00 - 0.90 %    Nucleated RBC 0.00 /100 WBC    Neutrophils (Absolute) 5.49 1.82 - 7.42 K/uL    Lymphs (Absolute) 2.31 1.00 - 4.80 K/uL    Monos (Absolute) 0.76 0.00 - 0.85 K/uL    Eos (Absolute) 0.24 0.00 - 0.51 K/uL    Baso (Absolute) 0.06 0.00 - 0.12 K/uL    Immature Granulocytes (abs) 0.06 0.00 - 0.11 K/uL    NRBC (Absolute) 0.00 K/uL   URINALYSIS    Specimen: Urine   Result Value Ref Range    Color Yellow     Character Clear     Specific Gravity 1.025 <1.035    Ph 6.0 5.0 - 8.0    Glucose Negative Negative mg/dL     Ketones Negative Negative mg/dL    Protein Negative Negative mg/dL    Bilirubin Negative Negative    Urobilinogen, Urine 0.2 Negative    Nitrite Negative Negative    Leukocyte Esterase Negative Negative    Occult Blood Negative Negative    Micro Urine Req see below      RADIOLOGY  CT-RENAL COLIC EVALUATION(A/P W/O)   Final Result      1.  1-2 mm left lower ureteral stone present at the level of the mid sacrum. No significant hydronephrosis.      2.  Tiny punctate 1 to 2 mm left lower renal calyceal stones.      3.  Fatty liver.        COURSE & MEDICAL DECISION MAKING  Nursing notes and vital signs were reviewed. (See chart for details)  The patients records were reviewed, history was obtained from the patient;     Evaluation left lower quadrant abdominal pain is consistent with ureteral colic.  Patient has 1 to 2 mm left lower ureteral stone, no no significant hydronephrosis.  Urinalysis is unremarkable.  Vital signs are stable without fever, tachycardia or hypotension.  Pain controlled with single dose of Toradol, nausea resolved with Zofran.  Tolerating oral fluids before discharge.    Patient is stable for discharge at this time, anticipatory guidance provided, Toradol for discomfort, Zofran for nausea vomiting, Flomax for 4 days, close follow-up is encouraged, and strict ED return instructions have been detailed. Patient is agreeable to the disposition and plan.    Patient's blood pressure was elevated in the emergency department, and has been referred to primary care for close monitoring.    FINAL IMPRESSION  (N23) Ureteral colic  (R10.32) Left lower quadrant pain  (N20.0) Nephrolithiasis      Electronically signed by: Meme Schroeder D.O., 10/12/2021 8:30 AM      This dictation was created using voice recognition software. The accuracy of the dictation is limited to the abilities of the software. I expect there may be some errors of grammar and possibly content. The nursing notes were reviewed and certain  aspects of this information were incorporated into this note.

## 2021-10-12 NOTE — ED NOTES
Discharge teaching and paperwork provided. All questions/concerns answered. VSS, assessment stable and PIV removed. Given information regarding prescriptions. Patient discharged to the care of friend and ambulated out of the ED with steady gai.t

## 2021-10-12 NOTE — DISCHARGE INSTRUCTIONS
Follow-up with primary care this week for reevaluation and referral to urology.  Follow-up with urology for reevaluation of recurrent kidney stones.  Stone analysis may be possible.      Strain urine for stone.    Toradol every 6 hours as needed for discomfort.  Zofran, oral dissolving tablet, every 4-6 hours as needed for nausea or vomiting.  Flomax daily for 4 days.    Encourage oral fluid hydration.  Diet and activity as tolerated.    Return to the emergency department for persistent or worsening pain, vomiting, fever or other new concerns.

## 2021-10-18 ENCOUNTER — HOSPITAL ENCOUNTER (EMERGENCY)
Facility: MEDICAL CENTER | Age: 36
End: 2021-10-18
Attending: EMERGENCY MEDICINE
Payer: COMMERCIAL

## 2021-10-18 ENCOUNTER — APPOINTMENT (OUTPATIENT)
Dept: RADIOLOGY | Facility: MEDICAL CENTER | Age: 36
End: 2021-10-18
Attending: EMERGENCY MEDICINE
Payer: COMMERCIAL

## 2021-10-18 VITALS
SYSTOLIC BLOOD PRESSURE: 129 MMHG | WEIGHT: 230 LBS | TEMPERATURE: 98.5 F | OXYGEN SATURATION: 99 % | RESPIRATION RATE: 16 BRPM | HEART RATE: 68 BPM | DIASTOLIC BLOOD PRESSURE: 67 MMHG | BODY MASS INDEX: 38.32 KG/M2 | HEIGHT: 65 IN

## 2021-10-18 DIAGNOSIS — R10.9 FLANK PAIN: ICD-10-CM

## 2021-10-18 DIAGNOSIS — R10.32 LEFT LOWER QUADRANT ABDOMINAL PAIN: ICD-10-CM

## 2021-10-18 LAB
ALBUMIN SERPL BCP-MCNC: 4.8 G/DL (ref 3.2–4.9)
ALBUMIN/GLOB SERPL: 1.5 G/DL
ALP SERPL-CCNC: 73 U/L (ref 30–99)
ALT SERPL-CCNC: 68 U/L (ref 2–50)
ANION GAP SERPL CALC-SCNC: 13 MMOL/L (ref 7–16)
APPEARANCE UR: CLEAR
AST SERPL-CCNC: 36 U/L (ref 12–45)
BASOPHILS # BLD AUTO: 0.4 % (ref 0–1.8)
BASOPHILS # BLD: 0.04 K/UL (ref 0–0.12)
BILIRUB SERPL-MCNC: 0.3 MG/DL (ref 0.1–1.5)
BILIRUB UR QL STRIP.AUTO: NEGATIVE
BUN SERPL-MCNC: 17 MG/DL (ref 8–22)
CALCIUM SERPL-MCNC: 9.5 MG/DL (ref 8.5–10.5)
CHLORIDE SERPL-SCNC: 106 MMOL/L (ref 96–112)
CO2 SERPL-SCNC: 20 MMOL/L (ref 20–33)
COLOR UR: YELLOW
CREAT SERPL-MCNC: 0.75 MG/DL (ref 0.5–1.4)
EOSINOPHIL # BLD AUTO: 0.13 K/UL (ref 0–0.51)
EOSINOPHIL NFR BLD: 1.2 % (ref 0–6.9)
ERYTHROCYTE [DISTWIDTH] IN BLOOD BY AUTOMATED COUNT: 43.9 FL (ref 35.9–50)
GLOBULIN SER CALC-MCNC: 3.2 G/DL (ref 1.9–3.5)
GLUCOSE SERPL-MCNC: 89 MG/DL (ref 65–99)
GLUCOSE UR STRIP.AUTO-MCNC: NEGATIVE MG/DL
HCT VFR BLD AUTO: 45.1 % (ref 42–52)
HGB BLD-MCNC: 15.4 G/DL (ref 14–18)
IMM GRANULOCYTES # BLD AUTO: 0.05 K/UL (ref 0–0.11)
IMM GRANULOCYTES NFR BLD AUTO: 0.5 % (ref 0–0.9)
KETONES UR STRIP.AUTO-MCNC: ABNORMAL MG/DL
LEUKOCYTE ESTERASE UR QL STRIP.AUTO: NEGATIVE
LYMPHOCYTES # BLD AUTO: 2.53 K/UL (ref 1–4.8)
LYMPHOCYTES NFR BLD: 24.3 % (ref 22–41)
MCH RBC QN AUTO: 30 PG (ref 27–33)
MCHC RBC AUTO-ENTMCNC: 34.1 G/DL (ref 33.7–35.3)
MCV RBC AUTO: 87.9 FL (ref 81.4–97.8)
MICRO URNS: ABNORMAL
MONOCYTES # BLD AUTO: 0.8 K/UL (ref 0–0.85)
MONOCYTES NFR BLD AUTO: 7.7 % (ref 0–13.4)
NEUTROPHILS # BLD AUTO: 6.88 K/UL (ref 1.82–7.42)
NEUTROPHILS NFR BLD: 65.9 % (ref 44–72)
NITRITE UR QL STRIP.AUTO: NEGATIVE
NRBC # BLD AUTO: 0 K/UL
NRBC BLD-RTO: 0 /100 WBC
PH UR STRIP.AUTO: 5.5 [PH] (ref 5–8)
PLATELET # BLD AUTO: 236 K/UL (ref 164–446)
PMV BLD AUTO: 10.7 FL (ref 9–12.9)
POTASSIUM SERPL-SCNC: 4.1 MMOL/L (ref 3.6–5.5)
PROT SERPL-MCNC: 8 G/DL (ref 6–8.2)
PROT UR QL STRIP: NEGATIVE MG/DL
RBC # BLD AUTO: 5.13 M/UL (ref 4.7–6.1)
RBC UR QL AUTO: NEGATIVE
SODIUM SERPL-SCNC: 139 MMOL/L (ref 135–145)
SP GR UR STRIP.AUTO: 1.03
UROBILINOGEN UR STRIP.AUTO-MCNC: 0.2 MG/DL
WBC # BLD AUTO: 10.4 K/UL (ref 4.8–10.8)

## 2021-10-18 PROCEDURE — 96375 TX/PRO/DX INJ NEW DRUG ADDON: CPT

## 2021-10-18 PROCEDURE — 96374 THER/PROPH/DIAG INJ IV PUSH: CPT

## 2021-10-18 PROCEDURE — 80053 COMPREHEN METABOLIC PANEL: CPT

## 2021-10-18 PROCEDURE — 74176 CT ABD & PELVIS W/O CONTRAST: CPT

## 2021-10-18 PROCEDURE — 700111 HCHG RX REV CODE 636 W/ 250 OVERRIDE (IP): Performed by: EMERGENCY MEDICINE

## 2021-10-18 PROCEDURE — 81003 URINALYSIS AUTO W/O SCOPE: CPT

## 2021-10-18 PROCEDURE — 85025 COMPLETE CBC W/AUTO DIFF WBC: CPT

## 2021-10-18 PROCEDURE — 87086 URINE CULTURE/COLONY COUNT: CPT

## 2021-10-18 PROCEDURE — 99284 EMERGENCY DEPT VISIT MOD MDM: CPT

## 2021-10-18 PROCEDURE — 36415 COLL VENOUS BLD VENIPUNCTURE: CPT

## 2021-10-18 RX ORDER — ONDANSETRON 2 MG/ML
4 INJECTION INTRAMUSCULAR; INTRAVENOUS ONCE
Status: COMPLETED | OUTPATIENT
Start: 2021-10-18 | End: 2021-10-18

## 2021-10-18 RX ORDER — KETOROLAC TROMETHAMINE 30 MG/ML
15 INJECTION, SOLUTION INTRAMUSCULAR; INTRAVENOUS ONCE
Status: COMPLETED | OUTPATIENT
Start: 2021-10-18 | End: 2021-10-18

## 2021-10-18 RX ADMIN — ONDANSETRON 4 MG: 2 INJECTION INTRAMUSCULAR; INTRAVENOUS at 19:00

## 2021-10-18 RX ADMIN — KETOROLAC TROMETHAMINE 15 MG: 30 INJECTION, SOLUTION INTRAMUSCULAR at 19:00

## 2021-10-18 ASSESSMENT — LIFESTYLE VARIABLES: DO YOU DRINK ALCOHOL: NO

## 2021-10-18 ASSESSMENT — FIBROSIS 4 INDEX: FIB4 SCORE: 0.73

## 2021-10-18 NOTE — ED TRIAGE NOTES
Chief Complaint   Patient presents with   • Nephrolithiasis     dx with kidney stones 10-12, states able to urinate only small amounts this morning. Bilateral flank pain

## 2021-10-19 NOTE — ED PROVIDER NOTES
"ED Provider Note    CHIEF COMPLAINT  Chief Complaint   Patient presents with   • Nephrolithiasis     dx with kidney stones 10-12, states able to urinate only small amounts this morning. Bilateral flank pain       HPI  Sridhar Underwood is a 36 y.o. male who presents to the ER with complaint of inability to urinate today, however, he was finally able to urinate here in the ER and reported it was dark in color.  He is also complaining of persistent pain in his left flank and left abdomen after being diagnosed with a small left ureteral stone here at [facility] 6 days ago.  No fevers or chills.  He also complains of some right flank pain which started 2 days ago.  He is concerned that perhaps the stone that was seen in his kidney on previous CT scan have \"dropped it down.\"  No nausea or vomiting.  He is 5 years clean from narcotic addiction and does not want any narcotics.  He says he also has some discomfort in the suprapubic area but that is pain that he has often as he \"always has stomach problems.\"  He reports some radiating pain from his left lower quadrant to his left testicle.  No testicular swelling.  No dysuria.  He admits he has not been straining his urine.  However, he has not seen any stone drop into the toilet bowl with urination.    REVIEW OF SYSTEMS  See HPI for further details. All other systems are negative.    PAST MEDICAL HISTORY  Past Medical History:   Diagnosis Date   • GERD (gastroesophageal reflux disease)        FAMILY HISTORY  No family history on file.    SOCIAL HISTORY  Social History     Socioeconomic History   • Marital status: Single     Spouse name: Not on file   • Number of children: Not on file   • Years of education: Not on file   • Highest education level: Not on file   Occupational History   • Not on file   Tobacco Use   • Smoking status: Current Every Day Smoker     Packs/day: 0.25     Years: 20.00     Pack years: 5.00     Types: Cigarettes   • Smokeless tobacco: Never " "Used   Vaping Use   • Vaping Use: Never used   Substance and Sexual Activity   • Alcohol use: Yes   • Drug use: Yes     Types: Inhaled     Comment: marijuana   • Sexual activity: Not on file   Other Topics Concern   • Not on file   Social History Narrative   • Not on file     Social Determinants of Health     Financial Resource Strain:    • Difficulty of Paying Living Expenses:    Food Insecurity:    • Worried About Running Out of Food in the Last Year:    • Ran Out of Food in the Last Year:    Transportation Needs:    • Lack of Transportation (Medical):    • Lack of Transportation (Non-Medical):    Physical Activity:    • Days of Exercise per Week:    • Minutes of Exercise per Session:    Stress:    • Feeling of Stress :    Social Connections:    • Frequency of Communication with Friends and Family:    • Frequency of Social Gatherings with Friends and Family:    • Attends [employer] Services:    • Active Member of Clubs or Organizations:    • Attends Club or Organization Meetings:    • Marital Status:    Intimate Partner Violence:    • Fear of Current or Ex-Partner:    • Emotionally Abused:    • Physically Abused:    • Sexually Abused:        SURGICAL HISTORY  Past Surgical History:   Procedure Laterality Date   • APPENDECTOMY         CURRENT MEDICATIONS  Home Medications     Reviewed by Gretel Sung R.N. (Registered Nurse) on 10/18/21 at 1613  Med List Status: <None>   Medication Last Dose Status   ketorolac (TORADOL) 10 MG Tab  Active   ondansetron (ZOFRAN ODT) 4 MG TABLET DISPERSIBLE  Active                ALLERGIES  No Known Allergies    PHYSICAL EXAM  VITAL SIGNS: /90   Pulse 76   Temp 37.1 °C (98.7 °F) (Temporal)   Resp 18   Ht 1.651 m (5' 5\")   Wt 104 kg (230 lb)   SpO2 94%   BMI 38.27 kg/m²      Constitutional: Well developed, well nourished; No acute distress; Non-toxic appearance.   HENT: Normocephalic, atraumatic; Bilateral external ears normal; oropharyngeal examination deferred due " to COVID-19 outbreak and lack of oropharyngeal complaint  Eyes: PERRL, EOMI, Conjunctiva normal. No discharge.   Neck:  Supple, nontender midline; No stridor; No nuchal rigidity.   Lymphatic: No cervical lymphadenopathy noted.   Cardiovascular: Regular rate and rhythm without murmurs, rubs, or gallop.   Thorax & Lungs: No respiratory distress, breath sounds clear to auscultation bilaterally without wheezing, rales or rhonchi. Nontender chest wall. No crepitus or subcutaneous air  Abdomen: Soft, nontender, bowel sounds normal. No obvious masses; No pulsatile masses; no rebound, guarding, or peritoneal signs.   Skin: Good color; warm and dry without rash or petechia.  Back: Nontender, No CVA tenderness.   Extremities: Distal radial, dorsalis pedis, posterior tibial pulses are equal bilaterally; No edema; Nontender calves or saphenous, No cyanosis, No clubbing.   Musculoskeletal: Good range of motion in all major joints. No tenderness to palpation or major deformities noted.   Neurologic: Alert & oriented x 4, clear speech      RADIOLOGY/PROCEDURES  CT-RENAL COLIC EVALUATION(A/P W/O)   Final Result         1.  NO HYDRONEPHROSIS OR HYDROURETER IDENTIFIED.      2.  NONOBSTRUCTING CALCIFICATION IS NOTED IN THE LOWER POLE OF THE LEFT KIDNEY AGAIN NOTED.      3.  NO DEFINITE URETERAL CALCIFICATIONS ARE CURRENTLY IDENTIFIED.             COURSE & MEDICAL DECISION MAKING  Pertinent Labs & Imaging studies reviewed. (See chart for details)    Results for orders placed or performed during the hospital encounter of 10/18/21   CBC WITH DIFFERENTIAL   Result Value Ref Range    WBC 10.4 4.8 - 10.8 K/uL    RBC 5.13 4.70 - 6.10 M/uL    Hemoglobin 15.4 14.0 - 18.0 g/dL    Hematocrit 45.1 42.0 - 52.0 %    MCV 87.9 81.4 - 97.8 fL    MCH 30.0 27.0 - 33.0 pg    MCHC 34.1 33.7 - 35.3 g/dL    RDW 43.9 35.9 - 50.0 fL    Platelet Count 236 164 - 446 K/uL    MPV 10.7 9.0 - 12.9 fL    Neutrophils-Polys 65.90 44.00 - 72.00 %    Lymphocytes 24.30  22.00 - 41.00 %    Monocytes 7.70 0.00 - 13.40 %    Eosinophils 1.20 0.00 - 6.90 %    Basophils 0.40 0.00 - 1.80 %    Immature Granulocytes 0.50 0.00 - 0.90 %    Nucleated RBC 0.00 /100 WBC    Neutrophils (Absolute) 6.88 1.82 - 7.42 K/uL    Lymphs (Absolute) 2.53 1.00 - 4.80 K/uL    Monos (Absolute) 0.80 0.00 - 0.85 K/uL    Eos (Absolute) 0.13 0.00 - 0.51 K/uL    Baso (Absolute) 0.04 0.00 - 0.12 K/uL    Immature Granulocytes (abs) 0.05 0.00 - 0.11 K/uL    NRBC (Absolute) 0.00 K/uL   COMP METABOLIC PANEL   Result Value Ref Range    Sodium 139 135 - 145 mmol/L    Potassium 4.1 3.6 - 5.5 mmol/L    Chloride 106 96 - 112 mmol/L    Co2 20 20 - 33 mmol/L    Anion Gap 13.0 7.0 - 16.0    Glucose 89 65 - 99 mg/dL    Bun 17 8 - 22 mg/dL    Creatinine 0.75 0.50 - 1.40 mg/dL    Calcium 9.5 8.5 - 10.5 mg/dL    AST(SGOT) 36 12 - 45 U/L    ALT(SGPT) 68 (H) 2 - 50 U/L    Alkaline Phosphatase 73 30 - 99 U/L    Total Bilirubin 0.3 0.1 - 1.5 mg/dL    Albumin 4.8 3.2 - 4.9 g/dL    Total Protein 8.0 6.0 - 8.2 g/dL    Globulin 3.2 1.9 - 3.5 g/dL    A-G Ratio 1.5 g/dL   URINALYSIS (UA)    Specimen: Urine, Clean Catch   Result Value Ref Range    Color Yellow     Character Clear     Specific Gravity 1.028 <1.035    Ph 5.5 5.0 - 8.0    Glucose Negative Negative mg/dL    Ketones Trace (A) Negative mg/dL    Protein Negative Negative mg/dL    Bilirubin Negative Negative    Urobilinogen, Urine 0.2 Negative    Nitrite Negative Negative    Leukocyte Esterase Negative Negative    Occult Blood Negative Negative    Micro Urine Req see below    ESTIMATED GFR   Result Value Ref Range    GFR If African American >60 >60 mL/min/1.73 m 2    GFR If Non African American >60 >60 mL/min/1.73 m 2       Patient presents today with concern over decreased urine output today.  He was finally able to urinate upon arrival to the ER and said his urine was dark in color.  He was seen here 6 days ago and diagnosed with a very small 1 to 2 mm left distal ureteral stone.   He is concerned that the stone has not yet passed.  He continues to have pain in his left flank and left lower abdomen.  He says he always has abdominal pain is typically located across the lower abdomen.  He is also complaining of a little bit of pain in his right flank over the last 2 days.  No fevers or chills.  No vomiting.  He is well-appearing overall.  He had some diffuse tenderness to the abdomen, mostly in the left lower quadrant and suprapubic region.  CT scan abdomen pelvis renal colic protocol reveals that the previously seen stone in the left distal ureter has passed.  He still has some stones up in the left kidney itself, but they are not causing any obstruction.  No hydronephrosis.  Urine is clear except for trace ketones.  No blood.  No signs of infection.  Kidney function is normal.  White count is normal.  Vital signs are normal stable.  The patient is not septic or toxic.  He is feeling completely back to normal with a dose of Toradol.  This time I think he is safe and stable for outpatient management discharge home.  It sounds like he has some chronic abdominal pain issues.  Perhaps this is just an exacerbation of his chronic abdominal pain.  CT scan does not reveal any other explanation for patient's abdominal or flank pain.  Additionally, he could still be having some ureteral spasm which appeared to get significantly better with the Toradol.  Patient needs to follow-up with urology.  Has been given referral to Dr. Oconnor, urologist on-call.  He has been given strict return precautions and discharge instructions and he understands treatment plan and follow-up.    I verified that the patient was wearing a mask and I was wearing appropriate PPE every time I entered the room. The patient's mask was on the patient at all times during my encounter     FINAL IMPRESSION  1. Flank pain Acute    2. Left lower quadrant abdominal pain Acute         This dictation has been created using voice recognition  software. The accuracy of the dictation is limited by the abilities of the software. I expect there may be some errors of grammar and possibly content. I made every attempt to manually correct the errors within my dictation. However, errors related to voice recognition software may still exist and should be interpreted within the appropriate context.    Electronically signed by: Yessica Mike M.D., 10/18/2021 5:57 PM

## 2021-10-19 NOTE — DISCHARGE INSTRUCTIONS
Return to the ER for any worsening flank pain, changing flank pain, fevers over 100.4, shaking chills, pain with urination, blood in urine, pain with urination, cloudy or foul-smelling urine, worsening or changing abdominal pain, nausea, vomiting, or for any concerns.    Drink plenty of fluids.    Follow-up with Dr. Oconnor, urologist, within the next 2 to 3 days.  Please call for appointment.

## 2021-10-19 NOTE — ED NOTES
Assumed care of the patient at this time. Patient is calm and in room with SO at bedside. Respirations even and unlabored. No pain or distress reported. Urine sample obtained and sent to lab. Will await result.

## 2021-10-19 NOTE — ED NOTES
Patient from [facility] to [place] [facility] ambulatory with steady gait accompanied by ED Tech and family.    Complaining of bilateral flank pain R > L, suprapubic pain, and penis pain 9/10. + dysuria. States that his last normal amount while urinating was 2 days ago. Endorses dark brown urine. Does not know whether or not he has passed and kidney stones. + nausea, no vomiting.    Chart up for ERP.

## 2021-10-20 LAB
BACTERIA UR CULT: NORMAL
SIGNIFICANT IND 70042: NORMAL
SITE SITE: NORMAL
SOURCE SOURCE: NORMAL

## 2021-11-14 ENCOUNTER — APPOINTMENT (OUTPATIENT)
Dept: RADIOLOGY | Facility: MEDICAL CENTER | Age: 36
End: 2021-11-14
Attending: EMERGENCY MEDICINE
Payer: COMMERCIAL

## 2021-11-14 ENCOUNTER — HOSPITAL ENCOUNTER (EMERGENCY)
Facility: MEDICAL CENTER | Age: 36
End: 2021-11-14
Attending: EMERGENCY MEDICINE
Payer: COMMERCIAL

## 2021-11-14 VITALS
BODY MASS INDEX: 39.82 KG/M2 | SYSTOLIC BLOOD PRESSURE: 138 MMHG | TEMPERATURE: 97 F | RESPIRATION RATE: 14 BRPM | DIASTOLIC BLOOD PRESSURE: 88 MMHG | HEART RATE: 82 BPM | HEIGHT: 65 IN | WEIGHT: 238.98 LBS | OXYGEN SATURATION: 98 %

## 2021-11-14 DIAGNOSIS — S05.02XA ABRASION OF LEFT CORNEA, INITIAL ENCOUNTER: ICD-10-CM

## 2021-11-14 DIAGNOSIS — R51.9 ACUTE NONINTRACTABLE HEADACHE, UNSPECIFIED HEADACHE TYPE: ICD-10-CM

## 2021-11-14 PROCEDURE — 96374 THER/PROPH/DIAG INJ IV PUSH: CPT

## 2021-11-14 PROCEDURE — 96375 TX/PRO/DX INJ NEW DRUG ADDON: CPT

## 2021-11-14 PROCEDURE — 700111 HCHG RX REV CODE 636 W/ 250 OVERRIDE (IP): Performed by: EMERGENCY MEDICINE

## 2021-11-14 PROCEDURE — 99284 EMERGENCY DEPT VISIT MOD MDM: CPT

## 2021-11-14 PROCEDURE — 70450 CT HEAD/BRAIN W/O DYE: CPT

## 2021-11-14 PROCEDURE — 700101 HCHG RX REV CODE 250: Performed by: EMERGENCY MEDICINE

## 2021-11-14 RX ORDER — KETOROLAC TROMETHAMINE 30 MG/ML
30 INJECTION, SOLUTION INTRAMUSCULAR; INTRAVENOUS ONCE
Status: COMPLETED | OUTPATIENT
Start: 2021-11-14 | End: 2021-11-14

## 2021-11-14 RX ORDER — POLYMYXIN B SULFATE AND TRIMETHOPRIM 1; 10000 MG/ML; [USP'U]/ML
1 SOLUTION OPHTHALMIC EVERY 4 HOURS
Status: COMPLETED | OUTPATIENT
Start: 2021-11-14 | End: 2021-11-14

## 2021-11-14 RX ORDER — PROPARACAINE HYDROCHLORIDE 5 MG/ML
1 SOLUTION/ DROPS OPHTHALMIC ONCE
Status: COMPLETED | OUTPATIENT
Start: 2021-11-14 | End: 2021-11-14

## 2021-11-14 RX ORDER — DIPHENHYDRAMINE HYDROCHLORIDE 50 MG/ML
25 INJECTION INTRAMUSCULAR; INTRAVENOUS ONCE
Status: COMPLETED | OUTPATIENT
Start: 2021-11-14 | End: 2021-11-14

## 2021-11-14 RX ORDER — PANTOPRAZOLE SODIUM 20 MG/1
20 TABLET, DELAYED RELEASE ORAL 2 TIMES DAILY
Status: SHIPPED | COMMUNITY
End: 2022-08-19

## 2021-11-14 RX ORDER — ERYTHROMYCIN 5 MG/G
1 OINTMENT OPHTHALMIC
Qty: 10 G | Refills: 0 | Status: SHIPPED | OUTPATIENT
Start: 2021-11-14 | End: 2021-11-21

## 2021-11-14 RX ORDER — DEXAMETHASONE SODIUM PHOSPHATE 4 MG/ML
10 INJECTION, SOLUTION INTRA-ARTICULAR; INTRALESIONAL; INTRAMUSCULAR; INTRAVENOUS; SOFT TISSUE ONCE
Status: COMPLETED | OUTPATIENT
Start: 2021-11-14 | End: 2021-11-14

## 2021-11-14 RX ORDER — ONDANSETRON 2 MG/ML
4 INJECTION INTRAMUSCULAR; INTRAVENOUS ONCE
Status: COMPLETED | OUTPATIENT
Start: 2021-11-14 | End: 2021-11-14

## 2021-11-14 RX ADMIN — FLUORESCEIN SODIUM 1 MG: 1 STRIP OPHTHALMIC at 11:00

## 2021-11-14 RX ADMIN — DEXAMETHASONE SODIUM PHOSPHATE 10 MG: 4 INJECTION, SOLUTION INTRA-ARTICULAR; INTRALESIONAL; INTRAMUSCULAR; INTRAVENOUS; SOFT TISSUE at 10:24

## 2021-11-14 RX ADMIN — PROPARACAINE HYDROCHLORIDE 1 DROP: 5 SOLUTION/ DROPS OPHTHALMIC at 11:00

## 2021-11-14 RX ADMIN — FLUORESCEIN SODIUM 1 MG: 1 STRIP OPHTHALMIC at 11:15

## 2021-11-14 RX ADMIN — KETOROLAC TROMETHAMINE 30 MG: 30 INJECTION, SOLUTION INTRAMUSCULAR; INTRAVENOUS at 10:23

## 2021-11-14 RX ADMIN — ONDANSETRON 4 MG: 2 INJECTION INTRAMUSCULAR; INTRAVENOUS at 10:24

## 2021-11-14 RX ADMIN — POLYMYXIN B SULFATE AND TRIMETHOPRIM SULFATE 1 DROP: 10000; 1 SOLUTION/ DROPS OPHTHALMIC at 11:33

## 2021-11-14 RX ADMIN — DIPHENHYDRAMINE HYDROCHLORIDE 25 MG: 50 INJECTION INTRAMUSCULAR; INTRAVENOUS at 10:24

## 2021-11-14 ASSESSMENT — FIBROSIS 4 INDEX: FIB4 SCORE: 0.65

## 2021-11-14 ASSESSMENT — LIFESTYLE VARIABLES: DO YOU DRINK ALCOHOL: NO

## 2021-11-14 NOTE — ED TRIAGE NOTES
Sridhar Underwood  36 y.o. M  Chief Complaint   Patient presents with   • Headache     x 8 days. Patient reports contant left sided head pain temporarily relieved by ibuprofen and excedrin.    • Eye Pain     L>R. Patient reports an intermittent burning sensation and sometimes feels like there is something stuck in his eye. Patient seeing yellow floaters.      Vitals:    11/14/21 0552   BP: 147/101   Pulse: 72   Resp: 16   Temp: (Abnormal) 35.8 °C (96.4 °F)   SpO2: 96%     Triage process explained to patient, apologized for wait time, and returned to [facility].  Pt informed to notify staff of any change in condition.

## 2021-11-14 NOTE — ED PROVIDER NOTES
"ED Provider Note    Scribed for Luisana Costa M.D. by Josue Cortez-Reyes. 11/14/2021, 9:53 AM.    Primary care provider: Mohsen Tamasaby, M.D.  Means of arrival: Walk-in  History obtained from: Patient  History limited by: None    CHIEF COMPLAINT  Chief Complaint   Patient presents with   • Headache     x 8 days. Patient reports contant left sided head pain temporarily relieved by ibuprofen and excedrin.    • Eye Pain     L>R. Patient reports an intermittent burning sensation and sometimes feels like there is something stuck in his eye. Patient seeing yellow floaters.        HPI  Sridhar Underwood is a 36 y.o. male who presents to the Emergency Department for evaluation of an intermittent headache onset 10 days ago. The patient endorses additional bilateral eye pain noting that it is greater in his left eye. 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 The patient adds that it sometimes feels as though he has something stuck in his eye. He endorses additional photophobia, nausea, eye discharge, and vision changes but denies any vomiting. He describes his vision as \"static\" with \"floating wires\". He notes that a hot shower and ibuprofen mildly alleviates his symptoms. The patient also notes that his brother had similar symptoms in the past.     REVIEW OF SYSTEMS  Pertinent positives include headache, bilateral eye pain (L>R), photophobia, nausea, eye discharge, and vision changes. Pertinent negatives include no vomiting.  All other systems reviewed and negative.    PAST MEDICAL HISTORY   has a past medical history of GERD (gastroesophageal reflux disease) and Kidney stone.    SURGICAL HISTORY   has a past surgical history that includes appendectomy.    SOCIAL HISTORY  Social History     Tobacco Use   • Smoking status: Former Smoker     Packs/day: 0.25     Years: 20.00     Pack years: 5.00     Types: Cigarettes   • Smokeless tobacco: Never Used   Vaping Use   • " "Vaping Use: Never used   Substance Use Topics   • Alcohol use: Yes   • Drug use: Yes     Types: Inhaled     Comment: marijuana      Social History     Substance and Sexual Activity   Drug Use Yes   • Types: Inhaled    Comment: marijuana       FAMILY HISTORY  History reviewed. No pertinent family history.    CURRENT MEDICATIONS  Home Medications     Reviewed by Zane Cortez (Pharmacy Tech) on 11/14/21 at 1031  Med List Status: Complete   Medication Last Dose Status   ASPIRIN-ACETAMINOPHEN-CAFFEINE PO 11/13/2021 Active   ketorolac (TORADOL) 10 MG Tab >2 weeks Active   ondansetron (ZOFRAN ODT) 4 MG TABLET DISPERSIBLE 3-4 days Active   pantoprazole (PROTONIX) 20 MG tablet 11/13/2021 Active                ALLERGIES  No Known Allergies    PHYSICAL EXAM  VITAL SIGNS: /98   Pulse (!) 114   Temp 36.1 °C (97 °F) (Temporal)   Resp 16   Ht 1.651 m (5' 5\")   Wt 108 kg (238 lb 15.7 oz)   SpO2 98%   BMI 39.77 kg/m²   Constitutional: Alert in no apparent distress.  HENT: No signs of trauma, Bilateral external ears normal, Nose normal.   Eyes: Pupils are equal and reactive, Conjunctiva normal, Extraocular moments intact, Non-icteric.   Neck: Normal range of motion, No tenderness, Supple, No stridor.   Cardiovascular: Regular rate and rhythm, no murmurs.   Thorax & Lungs: Normal breath sounds, No respiratory distress, No wheezing, No chest tenderness.   Skin: Warm, Dry, No erythema, No rash.   Musculoskeletal:  No major deformities noted.   Neurologic: Alert, moving all extremities without difficulty, no focal deficits.    RADIOLOGY  CT-HEAD W/O   Final Result      1.  No acute intracranial abnormality.   2.  Mild chronic paranasal sinus disease.        The radiologist's interpretation of all radiological studies have been reviewed by me.    COURSE & MEDICAL DECISION MAKING  Pertinent Labs & Imaging studies reviewed. (See chart for details)    Differential diagnoses include but are not limited to: Cluster " "headache, Migraine headache, and less likely Intracranial mass.     9:53 AM - Patient seen and examined at bedside. Patient will be treated with ketorolac, ondansetron, diphenhydramine, and dexamethasone. Ordered CT-Head to evaluate his symptoms. I informed the patient of my plan to treat him with the above medications and obtain the above imaging. Patient verbalizes understanding and agreement to this plan of care.     10:39 AM - Ordered proparacaine and fluorescein. I reevaluated the patient at bedside and examined his eyes to show small amount of fluorescein uptake on either side of the iris in a slit like pattern. I updated him on his CT findings noted above. I discussed plan for discharge and follow up as outlined below. The patient verbalizes they feel comfortable going home. The patient is stable for discharge at this time and will return for any new or worsening symptoms. Patient verbalizes understanding and support with my plan for discharge. Review of vital signs at this visit show: /98   Pulse (!) 114   Temp 36.1 °C (97 °F) (Temporal)   Resp 16   Ht 1.651 m (5' 5\")   Wt 108 kg (238 lb 15.7 oz)   SpO2 98%   BMI 39.77 kg/m²     Decision Making:  This is a 36 y.o. year old male who presents with left-sided headache.  Patient had a foreign body like a sensation in the left eye.  He was concerned about any kind of brain mass as well.  Head CT did not show any significant abnormality.  He was treated symptomatically.  I did do a fluorescein exam of his bilateral eyes and the left eye seem to have almost like a slit like distribution of fluorescein uptake as if there was a possible exposure with a squinted eye.  It is possible that he had an exposure to the eye causing some irritation and headache.d do think he can follow-up with an outpatient ophthalmologist.  He will be given Polytrim and erythromycin for the eye.  He is agreeable to this plan and will be discharged.    The patient will return for " new or worsening symptoms and is stable at the time of discharge. Patient was given return precautions. Patient and/or family member verbalizes understanding and will comply.    DISPOSITION:  Patient will be discharged home in stable condition.    FOLLOW UP:  River Penaloza M.D.  [address] [place] [id number]  [phone]    Schedule an appointment as soon as possible for a visit in 1 week      [facility], Emergency Dept  [address] [facility] [address] [phone]    Return for worsening pain, eye redness or other concerns      OUTPATIENT MEDICATIONS:  Discharge Medication List as of 11/14/2021 11:36 AM      START taking these medications    Details   erythromycin 5 MG/GM Ointment Apply 1 Application to both eyes 6 Times a Day for 7 days., Disp-10 g, R-0, Normal               FINAL IMPRESSION  1. Abrasion of left cornea, initial encounter    2. Acute nonintractable headache, unspecified headache type           This dictation has been created using voice recognition software and/or scribes. The accuracy of the dictation is limited by the abilities of the software and the expertise of the scribes. I expect there may be some errors of grammar and possibly content. I made every attempt to manually correct the errors within my dictation. However, errors related to voice recognition software and/or scribes may still exist and should be interpreted within the appropriate context.     I, Josue Cortez-Reyes (Scribe), am scribing for, and in the presence of, Luisana Costa M.D..    Electronically signed by: Josue Cortez-Reyes (Scribe), 11/14/2021    Luisana CANSECO M.D. personally performed the services described in this documentation, as scribed by Josue Cortez-Reyes in my presence, and it is both accurate and complete. C.    The note accurately reflects work and decisions made by me.  Luisana Costa M.D.  11/14/2021  12:47 PM

## 2022-04-04 ENCOUNTER — HOSPITAL ENCOUNTER (EMERGENCY)
Facility: MEDICAL CENTER | Age: 37
End: 2022-04-04
Attending: EMERGENCY MEDICINE
Payer: COMMERCIAL

## 2022-04-04 ENCOUNTER — APPOINTMENT (OUTPATIENT)
Dept: RADIOLOGY | Facility: MEDICAL CENTER | Age: 37
End: 2022-04-04
Attending: EMERGENCY MEDICINE
Payer: COMMERCIAL

## 2022-04-04 VITALS
SYSTOLIC BLOOD PRESSURE: 125 MMHG | RESPIRATION RATE: 14 BRPM | HEART RATE: 59 BPM | HEIGHT: 66 IN | TEMPERATURE: 97.9 F | OXYGEN SATURATION: 98 % | BODY MASS INDEX: 39.37 KG/M2 | DIASTOLIC BLOOD PRESSURE: 69 MMHG | WEIGHT: 245 LBS

## 2022-04-04 DIAGNOSIS — R04.2 SPITTING UP BLOOD: ICD-10-CM

## 2022-04-04 DIAGNOSIS — R10.32 LEFT LOWER QUADRANT PAIN: ICD-10-CM

## 2022-04-04 DIAGNOSIS — N23 URETERAL COLIC: ICD-10-CM

## 2022-04-04 DIAGNOSIS — R10.30 LOWER ABDOMINAL PAIN: ICD-10-CM

## 2022-04-04 LAB
ALBUMIN SERPL BCP-MCNC: 4.6 G/DL (ref 3.2–4.9)
ALBUMIN/GLOB SERPL: 1.6 G/DL
ALP SERPL-CCNC: 75 U/L (ref 30–99)
ALT SERPL-CCNC: 54 U/L (ref 2–50)
ANION GAP SERPL CALC-SCNC: 13 MMOL/L (ref 7–16)
APPEARANCE UR: CLEAR
AST SERPL-CCNC: 29 U/L (ref 12–45)
BASOPHILS # BLD AUTO: 0.5 % (ref 0–1.8)
BASOPHILS # BLD: 0.04 K/UL (ref 0–0.12)
BILIRUB SERPL-MCNC: 0.4 MG/DL (ref 0.1–1.5)
BILIRUB UR QL STRIP.AUTO: NEGATIVE
BUN SERPL-MCNC: 16 MG/DL (ref 8–22)
CALCIUM SERPL-MCNC: 9.5 MG/DL (ref 8.5–10.5)
CHLORIDE SERPL-SCNC: 102 MMOL/L (ref 96–112)
CO2 SERPL-SCNC: 20 MMOL/L (ref 20–33)
COLOR UR: YELLOW
CREAT SERPL-MCNC: 0.65 MG/DL (ref 0.5–1.4)
EKG IMPRESSION: NORMAL
EOSINOPHIL # BLD AUTO: 0.15 K/UL (ref 0–0.51)
EOSINOPHIL NFR BLD: 1.9 % (ref 0–6.9)
ERYTHROCYTE [DISTWIDTH] IN BLOOD BY AUTOMATED COUNT: 43.8 FL (ref 35.9–50)
GFR SERPLBLD CREATININE-BSD FMLA CKD-EPI: 125 ML/MIN/1.73 M 2
GLOBULIN SER CALC-MCNC: 2.8 G/DL (ref 1.9–3.5)
GLUCOSE SERPL-MCNC: 102 MG/DL (ref 65–99)
GLUCOSE UR STRIP.AUTO-MCNC: NEGATIVE MG/DL
HCT VFR BLD AUTO: 43.1 % (ref 42–52)
HGB BLD-MCNC: 14.4 G/DL (ref 14–18)
IMM GRANULOCYTES # BLD AUTO: 0.04 K/UL (ref 0–0.11)
IMM GRANULOCYTES NFR BLD AUTO: 0.5 % (ref 0–0.9)
KETONES UR STRIP.AUTO-MCNC: NEGATIVE MG/DL
LEUKOCYTE ESTERASE UR QL STRIP.AUTO: NEGATIVE
LIPASE SERPL-CCNC: 19 U/L (ref 11–82)
LYMPHOCYTES # BLD AUTO: 2.26 K/UL (ref 1–4.8)
LYMPHOCYTES NFR BLD: 28.6 % (ref 22–41)
MCH RBC QN AUTO: 29.5 PG (ref 27–33)
MCHC RBC AUTO-ENTMCNC: 33.4 G/DL (ref 33.7–35.3)
MCV RBC AUTO: 88.3 FL (ref 81.4–97.8)
MICRO URNS: ABNORMAL
MONOCYTES # BLD AUTO: 0.64 K/UL (ref 0–0.85)
MONOCYTES NFR BLD AUTO: 8.1 % (ref 0–13.4)
NEUTROPHILS # BLD AUTO: 4.76 K/UL (ref 1.82–7.42)
NEUTROPHILS NFR BLD: 60.4 % (ref 44–72)
NITRITE UR QL STRIP.AUTO: NEGATIVE
NRBC # BLD AUTO: 0 K/UL
NRBC BLD-RTO: 0 /100 WBC
PH UR STRIP.AUTO: 5.5 [PH] (ref 5–8)
PLATELET # BLD AUTO: 200 K/UL (ref 164–446)
PMV BLD AUTO: 10.6 FL (ref 9–12.9)
POTASSIUM SERPL-SCNC: 4.2 MMOL/L (ref 3.6–5.5)
PROT SERPL-MCNC: 7.4 G/DL (ref 6–8.2)
PROT UR QL STRIP: NEGATIVE MG/DL
RBC # BLD AUTO: 4.88 M/UL (ref 4.7–6.1)
RBC UR QL AUTO: NEGATIVE
SODIUM SERPL-SCNC: 135 MMOL/L (ref 135–145)
SP GR UR STRIP.AUTO: >=1.045
UROBILINOGEN UR STRIP.AUTO-MCNC: 0.2 MG/DL
WBC # BLD AUTO: 7.9 K/UL (ref 4.8–10.8)

## 2022-04-04 PROCEDURE — 96375 TX/PRO/DX INJ NEW DRUG ADDON: CPT

## 2022-04-04 PROCEDURE — 36415 COLL VENOUS BLD VENIPUNCTURE: CPT

## 2022-04-04 PROCEDURE — 85025 COMPLETE CBC W/AUTO DIFF WBC: CPT

## 2022-04-04 PROCEDURE — 74177 CT ABD & PELVIS W/CONTRAST: CPT

## 2022-04-04 PROCEDURE — 96374 THER/PROPH/DIAG INJ IV PUSH: CPT

## 2022-04-04 PROCEDURE — 83690 ASSAY OF LIPASE: CPT

## 2022-04-04 PROCEDURE — 99285 EMERGENCY DEPT VISIT HI MDM: CPT

## 2022-04-04 PROCEDURE — 81003 URINALYSIS AUTO W/O SCOPE: CPT

## 2022-04-04 PROCEDURE — 700111 HCHG RX REV CODE 636 W/ 250 OVERRIDE (IP): Performed by: EMERGENCY MEDICINE

## 2022-04-04 PROCEDURE — 700117 HCHG RX CONTRAST REV CODE 255: Performed by: EMERGENCY MEDICINE

## 2022-04-04 PROCEDURE — 93005 ELECTROCARDIOGRAM TRACING: CPT | Performed by: EMERGENCY MEDICINE

## 2022-04-04 PROCEDURE — 80053 COMPREHEN METABOLIC PANEL: CPT

## 2022-04-04 RX ORDER — ONDANSETRON 2 MG/ML
4 INJECTION INTRAMUSCULAR; INTRAVENOUS ONCE
Status: COMPLETED | OUTPATIENT
Start: 2022-04-04 | End: 2022-04-04

## 2022-04-04 RX ORDER — KETOROLAC TROMETHAMINE 30 MG/ML
15 INJECTION, SOLUTION INTRAMUSCULAR; INTRAVENOUS ONCE
Status: COMPLETED | OUTPATIENT
Start: 2022-04-04 | End: 2022-04-04

## 2022-04-04 RX ORDER — ONDANSETRON 4 MG/1
4 TABLET, ORALLY DISINTEGRATING ORAL EVERY 4 HOURS PRN
Qty: 10 TABLET | Refills: 0 | Status: SHIPPED | OUTPATIENT
Start: 2022-04-04 | End: 2022-08-19

## 2022-04-04 RX ORDER — SODIUM CHLORIDE 9 MG/ML
1000 INJECTION, SOLUTION INTRAVENOUS ONCE
Status: DISCONTINUED | OUTPATIENT
Start: 2022-04-04 | End: 2022-04-04 | Stop reason: HOSPADM

## 2022-04-04 RX ADMIN — ONDANSETRON 4 MG: 2 INJECTION INTRAMUSCULAR; INTRAVENOUS at 13:28

## 2022-04-04 RX ADMIN — IOHEXOL 100 ML: 350 INJECTION, SOLUTION INTRAVENOUS at 13:43

## 2022-04-04 RX ADMIN — KETOROLAC TROMETHAMINE 15 MG: 30 INJECTION, SOLUTION INTRAMUSCULAR at 13:52

## 2022-04-04 ASSESSMENT — FIBROSIS 4 INDEX: FIB4 SCORE: 0.67

## 2022-04-04 NOTE — ED NOTES
Pt ambulate to restroom with steady gait, sample obtained and sent. Pt reports feeling much better after medications.

## 2022-04-04 NOTE — ED TRIAGE NOTES
"Sridhar Underwood  37 y.o. male  Chief Complaint   Patient presents with   • Abdominal Pain     Reports has chronic abd pain but has been worse since this morning. Mostly lower quadrants but radiates throughout. Hx GERD.    • Blood in Vomit     States \"spit up\" some dark emesis/blood this morning with coughing. Also noted had bloody nose last night.     Pt presents to triage via EMS for above complaints. Also reports L sided throat pain and feels like voice is hoarse.     NAD at this time. Triage process explained to patient, returned to lobby. Pt encouraged to notify staff of any change in condition.   "

## 2022-04-04 NOTE — DISCHARGE INSTRUCTIONS
Please follow up with your primary care physician in 24 hours for abdominal recheck if your symptoms have not completely gone away. Call your primary care physician at the opening of business hours to let them know you were seen in the emergency department. Return immediately if your pain returns or worsens, if you develop any new symptoms, if you are not able to drink fluids, if you have persistent vomiting, if you develop fevers, or if you have any further concerns. Additionally, please return if your symptoms have not resolved and you are unable to follow up with your primary care physician for recheck.    Please contact your gastroenterologist to schedule a follow-up appointment for complete recheck.  Call tomorrow morning to schedule that appointment.

## 2022-04-04 NOTE — ED PROVIDER NOTES
ED Physician Note    Chief Complaint:   Abdominal pain    HPI:  Sridhar Underwood is a very pleasant 37-year-old gentleman who presents to the emergency department for evaluation of lower abdominal pain, as well as spitting up blood.  He states he has a history of epistaxis, and was seen last week by Dr.Chevalier, who states that he seemed to be improving.  He is currently using an ointment as well as nasal sprays to prevent epistaxis.  Additionally he states he has a history of acid reflux, with associated irritation of the throat, but has recently been told that those symptoms are improving as well.  [place], was told that he did have acid reflux but not peptic ulcer disease.  However today, he began to taste blood in his saliva, and spit blood-tinged saliva.  He is uncertain as to where the blood is coming from, he states he believes it may be his throat.  He contacted his ENT clinic, was told that his ENT was actually at [place] at this time, and on call.  He believes the clinic may be getting a hold of Dr. Chevalier.    He also describes lower abdominal pain that began this morning.  He has associated nausea, though no vomiting.  This is described as a tightness or squeezing sensation across the low abdomen.  He does have a history of prior appendectomy.  States that the pain does not lateralize, and migrates throughout the lower abdomen.  Pain has been persistent since time of onset, no alleviating factors identified.    He has had no associated fevers, no urinary symptoms.  He has no chest pain, no significant epigastric pain at this time.    Review of Systems:  See HPI for pertinent positives and negatives. All other systems negative.    Past Medical History:   has a past medical history of GERD (gastroesophageal reflux disease) and Kidney stone.    Social History:  Social History     Tobacco Use   • Smoking status: Former Smoker     Packs/day:  "0.25     Years: 20.00     Pack years: 5.00     Types: Cigarettes   • Smokeless tobacco: Never Used   Vaping Use   • Vaping Use: Never used   Substance and Sexual Activity   • Alcohol use: Not Currently   • Drug use: Not Currently     Types: Inhaled     Comment: marijuana   • Sexual activity: Not on file       Surgical History:   has a past surgical history that includes appendectomy.    Current Medications:  Home Medications     Reviewed by Cuca Rosales R.N. (Registered Nurse) on 04/04/22 at 1248  Med List Status: Partial   Medication Last Dose Status   ASPIRIN-ACETAMINOPHEN-CAFFEINE PO  Active   ketorolac (TORADOL) 10 MG Tab  Active   ondansetron (ZOFRAN ODT) 4 MG TABLET DISPERSIBLE  Active   pantoprazole (PROTONIX) 20 MG tablet 4/4/2022 Active                Allergies:  No Known Allergies    Physical Exam:  Vital Signs: /89   Pulse 60   Temp 36.9 °C (98.5 °F) (Temporal)   Resp 14   Ht 1.676 m (5' 6\")   Wt 111 kg (245 lb)   SpO2 96%   BMI 39.54 kg/m²   Constitutional: Alert, no acute distress  HENT: Normocephalic, mask in place  Eyes: Pupils equal and reactive, normal conjunctiva  Neck: Supple, normal range of motion, no stridor  Cardiovascular: Extremities are warm and well perfused, no murmur appreciated, normal cardiac auscultation  Pulmonary: No respiratory distress, normal work of breathing, no accessory muscule usage, breath sounds clear and equal bilaterally, no wheezing.  Abdomen: Soft, non-distended, mild discomfort on palpation of the lower abdomen, no peritoneal signs, no rebound, no guarding.  Epigastric area is not tender to palpation.  Skin: Warm, dry, no rashes or lesions  Musculoskeletal: Normal range of motion in all extremities, no swelling or deformity noted  Neurologic: Alert, oriented, normal speech, normal motor function  Psychiatric: Normal and appropriate mood and affect    Medical records reviewed for continuity of care.  Mr. Underwood was seen in the emergency department for " evaluation of headache 11/14/2021.  Emergency physician record reviewed.  He did report associated nausea.  Head CT showed no abnormality.  Eye exam was reassuring.  He was discharged home in stable condition.    EKG: Rate 55, sinus rhythm, no ST depression, ST elevation in V2 through V6, less than 1 mm, normal T wave morphology consistent with benign early repolarization pattern.  No pathologic T wave inversions.  No ectopy.    Labs:  Labs Reviewed   CBC WITH DIFFERENTIAL - Abnormal; Notable for the following components:       Result Value    MCHC 33.4 (*)     All other components within normal limits   COMP METABOLIC PANEL - Abnormal; Notable for the following components:    Glucose 102 (*)     ALT(SGPT) 54 (*)     All other components within normal limits   URINALYSIS - Abnormal; Notable for the following components:    Specific Gravity >=1.045 (*)     All other components within normal limits   LIPASE   ESTIMATED GFR       Radiology:  CT-ABDOMEN-PELVIS WITH   Final Result      1.  No acute intra-abdominal findings.      2.  Left nephrolithiasis.      3.  Hepatic steatosis           ED Medications Administered:  Medications   NS (BOLUS) infusion 1,000 mL (has no administration in time range)   ketorolac (TORADOL) injection 15 mg (15 mg Intravenous Given 4/4/22 1352)   ondansetron (ZOFRAN) syringe/vial injection 4 mg (4 mg Intravenous Given 4/4/22 1328)   iohexol (OMNIPAQUE) 350 mg/mL (IV) (100 mL Intravenous Given 4/4/22 1343)       Differential diagnosis:  Electrolyte abnormality, pharyngeal bleed, tonsillar bleed, epistaxis, diverticulitis, gastroenteritis, peptic ulcer, perforated ulcer, pancreatitis    MDM:  Mr. Underwood presents to the emergency department today for abdominal pain that began this morning, as well as spitting up a small amount of blood.  He has no epigastric pain, though does have significant nausea.  He reports no substernal chest pain.  Physical examination is reassuring, he is afebrile, vital  signs are less concerning for Sirs or sepsis.  His abdominal exam is reassuring without peritoneal signs.  Symptoms are less concerning for ACS, EKG with no evidence of ischemia.    On laboratory evaluation CMP, CBC, and lipase are all within normal limits.  No bands resulted at this time.  He has a normal white blood count.  This is less concerning for severe infectious etiology.  Liver enzymes are within normal limits, less concerning for acute hepatitis.  No evidence of acute pancreatitis.  Urinalysis is negative for evidence of infection.    CT abdomen pelvis demonstrates no acute intra-abdominal findings.  Incidental finding of small cyst in the anterior mid kidney noted, no additional follow-up recommended.    On reassessment after receiving Toradol and Zofran his symptoms have improved, and he is requesting discharge so that he can go get a meal.  Given reassuring imaging and lab findings, I do not believe he requires any further emergent diagnostics nor treatment.  He is currently established with [facility], he will call tomorrow morning to schedule a follow-up appointment for recheck.  I did notify his otolaryngologist that he was in the emergency department, as he is not having any active epistaxis, no indication for emergent ENT evaluation.  He can continue to follow-up with his otolaryngologist as scheduled.  He will call tomorrow morning to schedule follow-up appointment, he will also follow-up with primary care in 24 hours if his symptoms recur. Return precautions were discussed with the patient, and provided in written form with the patient's discharge instructions.     Personal protective equipment including N95 surgical respirator, goggles, and gloves were used during this encounter.       Disposition:  Discharge home in stable condition    Final Impression:  1. Lower abdominal pain    2. Spitting up blood    3. Ureteral colic    4. Left lower quadrant pain         Electronically signed by: Mari Gloria MD, 4/4/2022 9:46 PM

## 2022-04-04 NOTE — ED NOTES
Pt ambulate to room with steady gait, agree with triage notes. Pt reports last emesis at 0530 this morning.

## 2022-04-05 NOTE — DISCHARGE PLANNING
Received call from pt's significant other, asking for a work note. Work note printed, left at  [employer].

## 2022-05-28 ENCOUNTER — APPOINTMENT (OUTPATIENT)
Dept: RADIOLOGY | Facility: MEDICAL CENTER | Age: 37
End: 2022-05-28
Attending: EMERGENCY MEDICINE
Payer: COMMERCIAL

## 2022-05-28 ENCOUNTER — APPOINTMENT (OUTPATIENT)
Dept: RADIOLOGY | Facility: MEDICAL CENTER | Age: 37
End: 2022-05-28
Payer: COMMERCIAL

## 2022-05-28 ENCOUNTER — HOSPITAL ENCOUNTER (EMERGENCY)
Facility: MEDICAL CENTER | Age: 37
End: 2022-05-28
Attending: EMERGENCY MEDICINE
Payer: COMMERCIAL

## 2022-05-28 VITALS
TEMPERATURE: 98.6 F | BODY MASS INDEX: 41.14 KG/M2 | OXYGEN SATURATION: 96 % | SYSTOLIC BLOOD PRESSURE: 129 MMHG | DIASTOLIC BLOOD PRESSURE: 88 MMHG | RESPIRATION RATE: 16 BRPM | WEIGHT: 246.91 LBS | HEART RATE: 65 BPM | HEIGHT: 65 IN

## 2022-05-28 DIAGNOSIS — K60.2 RECTAL FISSURE: ICD-10-CM

## 2022-05-28 LAB
ABO GROUP BLD: NORMAL
ALBUMIN SERPL BCP-MCNC: 4.6 G/DL (ref 3.2–4.9)
ALBUMIN/GLOB SERPL: 1.4 G/DL
ALP SERPL-CCNC: 80 U/L (ref 30–99)
ALT SERPL-CCNC: 78 U/L (ref 2–50)
ANION GAP SERPL CALC-SCNC: 15 MMOL/L (ref 7–16)
APTT PPP: 30.8 SEC (ref 24.7–36)
AST SERPL-CCNC: 36 U/L (ref 12–45)
BASOPHILS # BLD AUTO: 0.8 % (ref 0–1.8)
BASOPHILS # BLD: 0.06 K/UL (ref 0–0.12)
BILIRUB SERPL-MCNC: 0.3 MG/DL (ref 0.1–1.5)
BLD GP AB SCN SERPL QL: NORMAL
BUN SERPL-MCNC: 11 MG/DL (ref 8–22)
CALCIUM SERPL-MCNC: 9.9 MG/DL (ref 8.5–10.5)
CHLORIDE SERPL-SCNC: 103 MMOL/L (ref 96–112)
CO2 SERPL-SCNC: 18 MMOL/L (ref 20–33)
CREAT SERPL-MCNC: 0.75 MG/DL (ref 0.5–1.4)
EOSINOPHIL # BLD AUTO: 0.18 K/UL (ref 0–0.51)
EOSINOPHIL NFR BLD: 2.5 % (ref 0–6.9)
ERYTHROCYTE [DISTWIDTH] IN BLOOD BY AUTOMATED COUNT: 43 FL (ref 35.9–50)
GFR SERPLBLD CREATININE-BSD FMLA CKD-EPI: 120 ML/MIN/1.73 M 2
GLOBULIN SER CALC-MCNC: 3.2 G/DL (ref 1.9–3.5)
GLUCOSE SERPL-MCNC: 112 MG/DL (ref 65–99)
HCT VFR BLD AUTO: 45.2 % (ref 42–52)
HGB BLD-MCNC: 15.5 G/DL (ref 14–18)
IMM GRANULOCYTES # BLD AUTO: 0.05 K/UL (ref 0–0.11)
IMM GRANULOCYTES NFR BLD AUTO: 0.7 % (ref 0–0.9)
INR PPP: 1.05 (ref 0.87–1.13)
LIPASE SERPL-CCNC: 23 U/L (ref 11–82)
LYMPHOCYTES # BLD AUTO: 1.82 K/UL (ref 1–4.8)
LYMPHOCYTES NFR BLD: 25.6 % (ref 22–41)
MCH RBC QN AUTO: 30.6 PG (ref 27–33)
MCHC RBC AUTO-ENTMCNC: 34.3 G/DL (ref 33.7–35.3)
MCV RBC AUTO: 89.3 FL (ref 81.4–97.8)
MONOCYTES # BLD AUTO: 0.69 K/UL (ref 0–0.85)
MONOCYTES NFR BLD AUTO: 9.7 % (ref 0–13.4)
NEUTROPHILS # BLD AUTO: 4.3 K/UL (ref 1.82–7.42)
NEUTROPHILS NFR BLD: 60.7 % (ref 44–72)
NRBC # BLD AUTO: 0 K/UL
NRBC BLD-RTO: 0 /100 WBC
PLATELET # BLD AUTO: 202 K/UL (ref 164–446)
PMV BLD AUTO: 10.5 FL (ref 9–12.9)
POTASSIUM SERPL-SCNC: 4.6 MMOL/L (ref 3.6–5.5)
PROT SERPL-MCNC: 7.8 G/DL (ref 6–8.2)
PROTHROMBIN TIME: 13.4 SEC (ref 12–14.6)
RBC # BLD AUTO: 5.06 M/UL (ref 4.7–6.1)
RH BLD: NORMAL
SODIUM SERPL-SCNC: 136 MMOL/L (ref 135–145)
WBC # BLD AUTO: 7.1 K/UL (ref 4.8–10.8)

## 2022-05-28 PROCEDURE — 36415 COLL VENOUS BLD VENIPUNCTURE: CPT

## 2022-05-28 PROCEDURE — 83690 ASSAY OF LIPASE: CPT

## 2022-05-28 PROCEDURE — 86901 BLOOD TYPING SEROLOGIC RH(D): CPT

## 2022-05-28 PROCEDURE — 86850 RBC ANTIBODY SCREEN: CPT

## 2022-05-28 PROCEDURE — 46600 DIAGNOSTIC ANOSCOPY SPX: CPT

## 2022-05-28 PROCEDURE — 99283 EMERGENCY DEPT VISIT LOW MDM: CPT

## 2022-05-28 PROCEDURE — 85610 PROTHROMBIN TIME: CPT

## 2022-05-28 PROCEDURE — 85730 THROMBOPLASTIN TIME PARTIAL: CPT

## 2022-05-28 PROCEDURE — 80053 COMPREHEN METABOLIC PANEL: CPT

## 2022-05-28 PROCEDURE — 85025 COMPLETE CBC W/AUTO DIFF WBC: CPT

## 2022-05-28 PROCEDURE — 71045 X-RAY EXAM CHEST 1 VIEW: CPT

## 2022-05-28 PROCEDURE — 86900 BLOOD TYPING SEROLOGIC ABO: CPT

## 2022-05-28 RX ORDER — POLYETHYLENE GLYCOL 3350 17 G/17G
17 POWDER, FOR SOLUTION ORAL DAILY
Qty: 30 EACH | Refills: 0 | Status: SHIPPED | OUTPATIENT
Start: 2022-05-28 | End: 2022-06-27

## 2022-05-28 ASSESSMENT — FIBROSIS 4 INDEX: FIB4 SCORE: 0.71

## 2022-05-28 NOTE — ED NOTES
Patient left ED ambulatory and in NAD. Verbalized understanding of RX to , home monitoring, f/u information and further discharge instructions. Denies further questions at time of discharge.

## 2022-05-28 NOTE — ED TRIAGE NOTES
Pt amb to triage.  Chief Complaint   Patient presents with   • Rectal Bleeding   • Rectal Pain     Pt reports blood in stool x3mo. Much worse today.

## 2022-05-28 NOTE — ED PROVIDER NOTES
ED Provider Note    Scribed for Sergei Manzano M.D. by Eddie Waldrop. 5/28/2022  9:12 AM    Primary care provider: Mohsen Tamasaby, M.D.  Means of arrival: Walk-In  History obtained from: Patient  History limited by: None    CHIEF COMPLAINT  Chief Complaint   Patient presents with   • Rectal Bleeding   • Rectal Pain       HPI  Sridhar Underwood is a 37 y.o. male with a history of GERD and body tremors who presents to the Emergency Department for evaluation of worsening dark red rectal bleeding onset 3 months ago. The patient endorses associated rectal pain when using the restroom, chronic upper abdominal pain, and constipation, but denies any associated passive rectal pain. He denies taking any blood thinning medications. His daily medications include Famotidine and Pantoprazole. He states that if he stops taking his pantoprazole, he experiences left-sided throat pain regularly. He denies any family history of colon cancer. He notes that he is sexually active, but denies any activities involving his anus. He denies eating any beets. He is followed by Dr. Menendez (GI NP) at [facility], where he reports getting an endoscopy and colonoscopy performed. He denies any history of similar symptoms. He notes that he had 3 painful bowel movements this morning. He endorses consuming fiber in his diet regularly. He reports that he only drinks water.    REVIEW OF SYSTEMS  Pertinent negatives include no passive rectal pain. As above, all other systems reviewed and are negative.   See HPI for further details.     PAST MEDICAL HISTORY   has a past medical history of GERD (gastroesophageal reflux disease) and Kidney stone.    SURGICAL HISTORY   has a past surgical history that includes appendectomy.    SOCIAL HISTORY  Social History     Tobacco Use   • Smoking status: Former Smoker     Packs/day: 0.25     Years: 20.00     Pack years: 5.00     Types: Cigarettes   • Smokeless tobacco: Never Used   Vaping Use   • Vaping  "Use: Never used   Substance Use Topics   • Alcohol use: Not Currently   • Drug use: Not Currently     Types: Inhaled     Comment: marijuana      Social History     Substance and Sexual Activity   Drug Use Not Currently   • Types: Inhaled    Comment: marijuana       FAMILY HISTORY  No family history noted.    CURRENT MEDICATIONS  Home Medications     Reviewed by Rachel Wright R.N. (Registered Nurse) on 05/28/22 at 0837  Med List Status: Partial   Medication Last Dose Status   ASPIRIN-ACETAMINOPHEN-CAFFEINE PO  Active   ketorolac (TORADOL) 10 MG Tab  Active   ondansetron (ZOFRAN ODT) 4 MG TABLET DISPERSIBLE  Active   pantoprazole (PROTONIX) 20 MG tablet  Active                ALLERGIES  No Known Allergies    PHYSICAL EXAM  VITAL SIGNS: BP (!) 152/92   Pulse 68   Temp 37 °C (98.6 °F) (Temporal)   Resp 18   Ht 1.651 m (5' 5\")   Wt 112 kg (246 lb 14.6 oz)   SpO2 95%   BMI 41.09 kg/m²     Constitutional: Well developed, Well nourished, No acute distress, Non-toxic appearance.   HENT: Normocephalic, Atraumatic, Bilateral external ears normal, Oropharynx is clear mucous membranes are moist. No oral exudates or nasal discharge.   Eyes: Pupils are equal round and reactive, EOMI, Conjunctiva normal, No discharge.   Neck: Normal range of motion, No tenderness, Supple, No stridor. No meningismus.  Lymphatic: No lymphadenopathy noted.   Cardiovascular: Regular rate and rhythm without murmur rub or gallop.  Thorax & Lungs: Clear breath sounds bilaterally without wheezes, rhonchi or rales. There is no chest wall tenderness.   Abdomen: Soft non-tender non-distended. There is no rebound or guarding. No organomegaly is appreciated. Bowel sounds are normal.  Skin: Normal without rash.   Back: No CVA or spinal tenderness.   Extremities: Intact distal pulses, No edema, No tenderness, No cyanosis, No clubbing. Capillary refill is less than 2 seconds. Pulses equal bilaterally 2+  Rectal: No hemorrhoids or fissures. There was " tenderness to palpation. No blood noted in the rectal cavity.  Musculoskeletal: Good range of motion in all major joints. No tenderness to palpation or major deformities noted.   Neurologic: Alert & oriented x 3, Normal motor function, Normal sensory function, No focal deficits noted. Reflexes are normal.  Psychiatric: Affect normal, Judgment normal, Mood normal. There is no suicidal ideation or patient reported hallucinations.       DIAGNOSTIC STUDIES / PROCEDURES    Anoscopy Procedure Note  Indication: Rectal bleeding    Procedure: The patient was placed in the left lateral decubitus position.  External inspection of the rectum and perianal area revealed no observed abnormalities.  A digital rectal exam was performed which revealed no observed abnormalities. The anoscope was gently inserted and the bowel was inspected.  Visualization was good.  Mucosa was normal.  Anoscopy revealed a non bleeding anal fissure at the 5 o'clock position.    The patient tolerated the procedure well.    Complication: None     LABS  Labs Reviewed   COMP METABOLIC PANEL - Abnormal; Notable for the following components:       Result Value    Co2 18 (*)     Glucose 112 (*)     ALT(SGPT) 78 (*)     All other components within normal limits    Narrative:     Indicate which anticoagulants the patient is on:->UNKNOWN   CBC WITH DIFFERENTIAL    Narrative:     Indicate which anticoagulants the patient is on:->UNKNOWN   LIPASE    Narrative:     Indicate which anticoagulants the patient is on:->UNKNOWN   PROTHROMBIN TIME    Narrative:     Indicate which anticoagulants the patient is on:->UNKNOWN   APTT    Narrative:     Indicate which anticoagulants the patient is on:->UNKNOWN   ESTIMATED GFR    Narrative:     Indicate which anticoagulants the patient is on:->UNKNOWN   COD (ADULT)   ABO RH CONFIRM      All labs reviewed by me.    RADIOLOGY  DX-CHEST-PORTABLE (1 VIEW)   Final Result      No acute cardiac or pulmonary abnormalities are identified.         The radiologist's interpretation of all radiological studies have been reviewed by me.    COURSE & MEDICAL DECISION MAKING  Nursing notes, VS, PMSFHx reviewed in chart.    9:12 AM Patient seen and examined at bedside. Ordered for DX-Chest, COD Adult, CBC w/diff, CMP, Lipase, PTT, and APTT to evaluate.      9:33 AM - Patient was reevaluated at bedside. The patient states that he has previously received 2 endoscopies and a colonoscopy. Informed the patient that I believe a fissure is likely at this time.     9:56 AM - Anoscopy performed now by me.  This identifies rectal fissure.  This is the cause of his pain.  There is no active bleeding.  I informed patient that he would be prescribed Miralax, and if his symptoms worsen his dose should be reduced by half. I will be providing a work note for today and tomorrow. I discussed plan for discharge and follow up as outlined below. The patient is stable for discharge at this time and will return for any new or worsening symptoms. Patient verbalizes understanding and support with my plan for discharge.      Patient has had high blood pressure while in the emergency department, felt likely secondary to medical condition. Counseled patient to monitor blood pressure at home and follow up with primary care physician.      The patient will return for new or worsening symptoms and is stable at the time of discharge.    DISPOSITION:  Patient will be discharged home in stable condition.  We talked about high-fiber diet and preventative measures in the future and he will return if significant change in symptoms    FINAL IMPRESSION  1. Rectal fissure          Eddie CANSECO ([place]), am scribing for, and in the presence of, Sergei Manzano M.D..    Electronically signed by: Eddie Waldrop ([place]), 5/28/2022    Sergei CANSECO M.D. personally performed the services described in this documentation, as scribed by Eddie Waldrop in my presence, and it is both accurate and complete.  C.    The note accurately reflects work and decisions made by me.  Sergei Manzano M.D.  5/28/2022  10:15 AM

## 2022-08-19 ENCOUNTER — OFFICE VISIT (OUTPATIENT)
Dept: URGENT CARE | Facility: PHYSICIAN GROUP | Age: 37
End: 2022-08-19
Payer: COMMERCIAL

## 2022-08-19 VITALS
OXYGEN SATURATION: 97 % | BODY MASS INDEX: 39.32 KG/M2 | TEMPERATURE: 100.2 F | RESPIRATION RATE: 19 BRPM | HEART RATE: 88 BPM | WEIGHT: 236 LBS | DIASTOLIC BLOOD PRESSURE: 58 MMHG | HEIGHT: 65 IN | SYSTOLIC BLOOD PRESSURE: 122 MMHG

## 2022-08-19 DIAGNOSIS — U07.1 COVID-19: ICD-10-CM

## 2022-08-19 LAB
EXTERNAL QUALITY CONTROL: ABNORMAL
SARS-COV+SARS-COV-2 AG RESP QL IA.RAPID: POSITIVE

## 2022-08-19 PROCEDURE — 99203 OFFICE O/P NEW LOW 30 MIN: CPT | Mod: 25,CS

## 2022-08-19 PROCEDURE — 87426 SARSCOV CORONAVIRUS AG IA: CPT

## 2022-08-19 ASSESSMENT — ENCOUNTER SYMPTOMS
SORE THROAT: 1
COUGH: 1
SPUTUM PRODUCTION: 1
CARDIOVASCULAR NEGATIVE: 1
FEVER: 0
VOMITING: 0
EYES NEGATIVE: 1
CHILLS: 1
NAUSEA: 1
DIARRHEA: 1

## 2022-08-19 ASSESSMENT — FIBROSIS 4 INDEX: FIB4 SCORE: 0.73

## 2022-08-19 NOTE — PROGRESS NOTES
Subjective     Sridhar Underwood is a 37 y.o. male who presents with Other (Pt states that he tested positive this morning for covid )            HPI    Patient presents with symptoms that started 2 days ago.  He endorses chills and subjective fevers.  This was also accompanied by fatigue, nasal congestion, rhinorrhea, sore throat, productive cough nausea, and diarrhea.  He reports 3 episodes of watery nonmucoid nonbloody stools that started today.  He has been taking OTC cough/sinus preparations, reports some relief from this.  Patient tested positive for COVID-19 at home.  He is here to confirm with another test.    Patient's current problem list, medications, and past medical/surgical history were reviewed in Epic.    PMH:  has a past medical history of GERD (gastroesophageal reflux disease) and Kidney stone.  MEDS:   Current Outpatient Medications:     ASPIRIN-ACETAMINOPHEN-CAFFEINE PO, Take 1-2 Tablets by mouth 1 time a day as needed for Headache., Disp: , Rfl:   ALLERGIES: No Known Allergies  SURGHX:   Past Surgical History:   Procedure Laterality Date    APPENDECTOMY       SOCHX:  reports that he has quit smoking. His smoking use included cigarettes. He has a 5.00 pack-year smoking history. He has never used smokeless tobacco. He reports that he does not currently use alcohol. He reports that he does not currently use drugs after having used the following drugs: Inhaled.  FH: Reviewed with patient, not pertinent to this visit.     Review of Systems   Constitutional:  Positive for chills and malaise/fatigue. Negative for fever.   HENT:  Positive for congestion and sore throat.         Rhinorrhea    Eyes: Negative.    Respiratory:  Positive for cough and sputum production.    Cardiovascular: Negative.    Gastrointestinal:  Positive for diarrhea and nausea. Negative for vomiting.            Objective     /58 (BP Location: Right arm, Patient Position: Sitting, BP Cuff Size: Adult)   Pulse (!) 106    "Temp 37.9 °C (100.2 °F) (Temporal)   Resp (!) 24   Ht 1.651 m (5' 5\")   Wt 107 kg (236 lb)   SpO2 97%   BMI 39.27 kg/m²      Physical Exam  Constitutional:       Appearance: Normal appearance.   HENT:      Head: Normocephalic.      Nose: Congestion present.      Mouth/Throat:      Pharynx: Posterior oropharyngeal erythema present.   Eyes:      Extraocular Movements: Extraocular movements intact.   Cardiovascular:      Rate and Rhythm: Normal rate and regular rhythm.      Pulses: Normal pulses.      Heart sounds: Normal heart sounds.   Pulmonary:      Effort: Pulmonary effort is normal.      Breath sounds: Normal breath sounds.   Musculoskeletal:         General: Normal range of motion.      Cervical back: Normal range of motion.   Skin:     General: Skin is warm.   Neurological:      General: No focal deficit present.      Mental Status: He is alert.   Psychiatric:         Mood and Affect: Mood normal.         Behavior: Behavior normal.     Results:    Rapid COVID test-positive                Assessment & Plan       1. COVID-19    - POCT SARS-COV Antigen GALINA (Symptomatic only)       Patient is instructed to quarantine per CDC guidelines.  Advised symptomatic treatment at home. Discussed treatment plan with patient, he is agreeable and verbalized understanding.  Educated patient on signs and symptoms watch out for, when to return to the clinic or go to the ER.    Recommended supportive treatment at home:  OTC Tylenol or Motrin for fever/discomfort.  OTC supportive care for nasal congestion - saline nasal spray/Flonase nasal spray and/or netipot  Humidifier and steam inhalation/warm showers.  Increase oral fluid intake.      Electronically Signed by VALERIE Haile                     "

## 2022-09-13 NOTE — DISCHARGE INSTRUCTIONS
All of your tests here were normal.  There are no signs of damage to your heart or lungs.  Your chest pain can be from the joints in the center of your chest where your ribs meet your breastbone, or from reflux.  If you would like, you can switch from the omeprazole you are taking to the different medication we have prescribed.  It is very important that you use the clinics listed here to get set up with a primary care doctor.   no...

## 2023-04-20 ENCOUNTER — OFFICE VISIT (OUTPATIENT)
Dept: URGENT CARE | Facility: PHYSICIAN GROUP | Age: 38
End: 2023-04-20
Payer: COMMERCIAL

## 2023-04-20 VITALS
BODY MASS INDEX: 38.82 KG/M2 | DIASTOLIC BLOOD PRESSURE: 70 MMHG | TEMPERATURE: 99.3 F | SYSTOLIC BLOOD PRESSURE: 140 MMHG | OXYGEN SATURATION: 96 % | RESPIRATION RATE: 16 BRPM | HEART RATE: 88 BPM | HEIGHT: 65 IN | WEIGHT: 233 LBS

## 2023-04-20 DIAGNOSIS — J34.89 NOSE DRYNESS: ICD-10-CM

## 2023-04-20 PROCEDURE — 99213 OFFICE O/P EST LOW 20 MIN: CPT | Performed by: FAMILY MEDICINE

## 2023-04-20 ASSESSMENT — FIBROSIS 4 INDEX: FIB4 SCORE: 0.75

## 2023-04-20 NOTE — LETTER
April 20, 2023    To Whom It May Concern:         This is confirmation that Sridhar Gelacio Yasmine attended his scheduled appointment with Maria Antonia Eduardo M.D. on 4/20/23.         If you have any questions please do not hesitate to call me at the phone number listed below.    Sincerely,          Maria Antonia Eduardo M.D.  [phone]

## 2023-04-20 NOTE — PROGRESS NOTES
"  Subjective:      38 y.o. male presents to urgent care for dry nose with bloody discharge.  This has been ongoing for about 4 years since he moved to [place].  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  He has had this sensation of dry nose and sinuses intermittently for the last 4 years, he does not have bloody noses but notes the mucus from his nose had blood mixed in with it. No tobacco product use.  No history of asthma or COPD.  He is fully vaccinated against COVID.  No known sick contacts.    He denies any other questions or concerns at this time.    Current problem list, medication, and past medical/surgical history were reviewed in Epic.    ROS  See HPI     Objective:      BP (!) 140/70 (BP Location: Right arm, Patient Position: Sitting, BP Cuff Size: Adult)   Pulse 88   Temp 37.4 °C (99.3 °F) (Temporal)   Resp 16   Ht 1.651 m (5' 5\")   Wt 106 kg (233 lb)   SpO2 96%   BMI 38.77 kg/m²     Physical Exam  Constitutional:       General: He is not in acute distress.     Appearance: He is not diaphoretic.   Cardiovascular:      Rate and Rhythm: Normal rate and regular rhythm.      Heart sounds: Normal heart sounds.   Pulmonary:      Effort: Pulmonary effort is normal. No respiratory distress.      Breath sounds: Normal breath sounds.   Neurological:      Mental Status: He is alert.   Psychiatric:         Mood and Affect: Affect normal.         Judgment: Judgment normal.     Assessment/Plan:     1. Nose dryness  He was encouraged to get a humidifier.  Use a nasal saline spray to add moisture back into the nose.  May also use Aquaphor or Vaseline on a Q-tip to coat the nose.      Instructed to return to Urgent Care or nearest Emergency Department if symptoms fail to improve, for any change in condition, further concerns, or new concerning symptoms. Patient states understanding of the plan of care and discharge instructions.    Maria Antonia Eduardo M.D.   "

## 2023-06-28 ENCOUNTER — HOSPITAL ENCOUNTER (OUTPATIENT)
Dept: LAB | Facility: MEDICAL CENTER | Age: 38
End: 2023-06-28
Attending: NURSE PRACTITIONER
Payer: COMMERCIAL

## 2023-06-28 ENCOUNTER — HOSPITAL ENCOUNTER (OUTPATIENT)
Dept: LAB | Facility: MEDICAL CENTER | Age: 38
End: 2023-06-28
Attending: FAMILY MEDICINE
Payer: COMMERCIAL

## 2023-06-28 LAB
ALBUMIN SERPL BCP-MCNC: 4.5 G/DL (ref 3.2–4.9)
ALBUMIN SERPL BCP-MCNC: 4.5 G/DL (ref 3.2–4.9)
ALBUMIN/GLOB SERPL: 1.5 G/DL
ALBUMIN/GLOB SERPL: 1.6 G/DL
ALP SERPL-CCNC: 74 U/L (ref 30–99)
ALP SERPL-CCNC: 76 U/L (ref 30–99)
ALT SERPL-CCNC: 44 U/L (ref 2–50)
ALT SERPL-CCNC: 44 U/L (ref 2–50)
ANION GAP SERPL CALC-SCNC: 13 MMOL/L (ref 7–16)
ANION GAP SERPL CALC-SCNC: 13 MMOL/L (ref 7–16)
AST SERPL-CCNC: 23 U/L (ref 12–45)
AST SERPL-CCNC: 24 U/L (ref 12–45)
BASOPHILS # BLD AUTO: 0.7 % (ref 0–1.8)
BASOPHILS # BLD AUTO: 0.8 % (ref 0–1.8)
BASOPHILS # BLD: 0.06 K/UL (ref 0–0.12)
BASOPHILS # BLD: 0.07 K/UL (ref 0–0.12)
BILIRUB SERPL-MCNC: 0.3 MG/DL (ref 0.1–1.5)
BILIRUB SERPL-MCNC: 0.3 MG/DL (ref 0.1–1.5)
BUN SERPL-MCNC: 14 MG/DL (ref 8–22)
BUN SERPL-MCNC: 14 MG/DL (ref 8–22)
CALCIUM ALBUM COR SERPL-MCNC: 9.2 MG/DL (ref 8.5–10.5)
CALCIUM ALBUM COR SERPL-MCNC: 9.3 MG/DL (ref 8.5–10.5)
CALCIUM SERPL-MCNC: 9.6 MG/DL (ref 8.5–10.5)
CALCIUM SERPL-MCNC: 9.7 MG/DL (ref 8.5–10.5)
CHLORIDE SERPL-SCNC: 104 MMOL/L (ref 96–112)
CHLORIDE SERPL-SCNC: 104 MMOL/L (ref 96–112)
CHOLEST SERPL-MCNC: 191 MG/DL (ref 100–199)
CO2 SERPL-SCNC: 20 MMOL/L (ref 20–33)
CO2 SERPL-SCNC: 20 MMOL/L (ref 20–33)
CREAT SERPL-MCNC: 0.7 MG/DL (ref 0.5–1.4)
CREAT SERPL-MCNC: 0.72 MG/DL (ref 0.5–1.4)
EOSINOPHIL # BLD AUTO: 0.2 K/UL (ref 0–0.51)
EOSINOPHIL # BLD AUTO: 0.24 K/UL (ref 0–0.51)
EOSINOPHIL NFR BLD: 2.2 % (ref 0–6.9)
EOSINOPHIL NFR BLD: 2.8 % (ref 0–6.9)
ERYTHROCYTE [DISTWIDTH] IN BLOOD BY AUTOMATED COUNT: 43.1 FL (ref 35.9–50)
ERYTHROCYTE [DISTWIDTH] IN BLOOD BY AUTOMATED COUNT: 43.7 FL (ref 35.9–50)
EST. AVERAGE GLUCOSE BLD GHB EST-MCNC: 114 MG/DL
GFR SERPLBLD CREATININE-BSD FMLA CKD-EPI: 120 ML/MIN/1.73 M 2
GFR SERPLBLD CREATININE-BSD FMLA CKD-EPI: 121 ML/MIN/1.73 M 2
GLOBULIN SER CALC-MCNC: 2.9 G/DL (ref 1.9–3.5)
GLOBULIN SER CALC-MCNC: 3 G/DL (ref 1.9–3.5)
GLUCOSE SERPL-MCNC: 83 MG/DL (ref 65–99)
GLUCOSE SERPL-MCNC: 83 MG/DL (ref 65–99)
HBA1C MFR BLD: 5.6 % (ref 4–5.6)
HCT VFR BLD AUTO: 45.9 % (ref 42–52)
HCT VFR BLD AUTO: 46.4 % (ref 42–52)
HDLC SERPL-MCNC: 24 MG/DL
HGB BLD-MCNC: 14.8 G/DL (ref 14–18)
HGB BLD-MCNC: 15.1 G/DL (ref 14–18)
IMM GRANULOCYTES # BLD AUTO: 0.04 K/UL (ref 0–0.11)
IMM GRANULOCYTES # BLD AUTO: 0.05 K/UL (ref 0–0.11)
IMM GRANULOCYTES NFR BLD AUTO: 0.5 % (ref 0–0.9)
IMM GRANULOCYTES NFR BLD AUTO: 0.6 % (ref 0–0.9)
LDLC SERPL CALC-MCNC: ABNORMAL MG/DL
LYMPHOCYTES # BLD AUTO: 2.59 K/UL (ref 1–4.8)
LYMPHOCYTES # BLD AUTO: 2.74 K/UL (ref 1–4.8)
LYMPHOCYTES NFR BLD: 29.9 % (ref 22–41)
LYMPHOCYTES NFR BLD: 30.8 % (ref 22–41)
MCH RBC QN AUTO: 29 PG (ref 27–33)
MCH RBC QN AUTO: 29.8 PG (ref 27–33)
MCHC RBC AUTO-ENTMCNC: 31.9 G/DL (ref 32.3–36.5)
MCHC RBC AUTO-ENTMCNC: 32.9 G/DL (ref 32.3–36.5)
MCV RBC AUTO: 90.7 FL (ref 81.4–97.8)
MCV RBC AUTO: 90.8 FL (ref 81.4–97.8)
MONOCYTES # BLD AUTO: 0.72 K/UL (ref 0–0.85)
MONOCYTES # BLD AUTO: 0.74 K/UL (ref 0–0.85)
MONOCYTES NFR BLD AUTO: 8.3 % (ref 0–13.4)
MONOCYTES NFR BLD AUTO: 8.3 % (ref 0–13.4)
NEUTROPHILS # BLD AUTO: 5.01 K/UL (ref 1.82–7.42)
NEUTROPHILS # BLD AUTO: 5.11 K/UL (ref 1.82–7.42)
NEUTROPHILS NFR BLD: 57.4 % (ref 44–72)
NEUTROPHILS NFR BLD: 57.7 % (ref 44–72)
NRBC # BLD AUTO: 0 K/UL
NRBC # BLD AUTO: 0 K/UL
NRBC BLD-RTO: 0 /100 WBC (ref 0–0.2)
NRBC BLD-RTO: 0 /100 WBC (ref 0–0.2)
PLATELET # BLD AUTO: 217 K/UL (ref 164–446)
PLATELET # BLD AUTO: 223 K/UL (ref 164–446)
PMV BLD AUTO: 10.9 FL (ref 9–12.9)
PMV BLD AUTO: 11.2 FL (ref 9–12.9)
POTASSIUM SERPL-SCNC: 4.4 MMOL/L (ref 3.6–5.5)
POTASSIUM SERPL-SCNC: 4.4 MMOL/L (ref 3.6–5.5)
PROT SERPL-MCNC: 7.4 G/DL (ref 6–8.2)
PROT SERPL-MCNC: 7.5 G/DL (ref 6–8.2)
RBC # BLD AUTO: 5.06 M/UL (ref 4.7–6.1)
RBC # BLD AUTO: 5.11 M/UL (ref 4.7–6.1)
SODIUM SERPL-SCNC: 137 MMOL/L (ref 135–145)
SODIUM SERPL-SCNC: 137 MMOL/L (ref 135–145)
TRIGL SERPL-MCNC: 408 MG/DL (ref 0–149)
TSH SERPL DL<=0.005 MIU/L-ACNC: 0.71 UIU/ML (ref 0.38–5.33)
URATE SERPL-MCNC: 6.5 MG/DL (ref 2.5–8.3)
WBC # BLD AUTO: 8.7 K/UL (ref 4.8–10.8)
WBC # BLD AUTO: 8.9 K/UL (ref 4.8–10.8)

## 2023-06-28 PROCEDURE — 83036 HEMOGLOBIN GLYCOSYLATED A1C: CPT

## 2023-06-28 PROCEDURE — 85025 COMPLETE CBC W/AUTO DIFF WBC: CPT | Mod: 91

## 2023-06-28 PROCEDURE — 82465 ASSAY BLD/SERUM CHOLESTEROL: CPT

## 2023-06-28 PROCEDURE — 80053 COMPREHEN METABOLIC PANEL: CPT

## 2023-06-28 PROCEDURE — 84443 ASSAY THYROID STIM HORMONE: CPT

## 2023-06-28 PROCEDURE — 83883 ASSAY NEPHELOMETRY NOT SPEC: CPT

## 2023-06-28 PROCEDURE — 80061 LIPID PANEL: CPT

## 2023-06-28 PROCEDURE — 84478 ASSAY OF TRIGLYCERIDES: CPT

## 2023-06-28 PROCEDURE — 80053 COMPREHEN METABOLIC PANEL: CPT | Mod: 91

## 2023-06-28 PROCEDURE — 82977 ASSAY OF GGT: CPT

## 2023-06-28 PROCEDURE — 82947 ASSAY GLUCOSE BLOOD QUANT: CPT

## 2023-06-28 PROCEDURE — 83010 ASSAY OF HAPTOGLOBIN QUANT: CPT

## 2023-06-28 PROCEDURE — 82172 ASSAY OF APOLIPOPROTEIN: CPT

## 2023-06-28 PROCEDURE — 85025 COMPLETE CBC W/AUTO DIFF WBC: CPT

## 2023-06-28 PROCEDURE — 84550 ASSAY OF BLOOD/URIC ACID: CPT

## 2023-06-28 PROCEDURE — 36415 COLL VENOUS BLD VENIPUNCTURE: CPT

## 2023-06-28 PROCEDURE — 84450 TRANSFERASE (AST) (SGOT): CPT

## 2023-08-21 ENCOUNTER — OFFICE VISIT (OUTPATIENT)
Dept: URGENT CARE | Facility: PHYSICIAN GROUP | Age: 38
End: 2023-08-21
Payer: COMMERCIAL

## 2023-08-21 VITALS
RESPIRATION RATE: 16 BRPM | BODY MASS INDEX: 39.09 KG/M2 | HEIGHT: 65 IN | WEIGHT: 234.6 LBS | TEMPERATURE: 98.9 F | HEART RATE: 94 BPM | DIASTOLIC BLOOD PRESSURE: 78 MMHG | SYSTOLIC BLOOD PRESSURE: 108 MMHG | OXYGEN SATURATION: 97 %

## 2023-08-21 DIAGNOSIS — M25.561 ACUTE PAIN OF RIGHT KNEE: ICD-10-CM

## 2023-08-21 DIAGNOSIS — M25.522 LEFT ELBOW PAIN: ICD-10-CM

## 2023-08-21 PROCEDURE — 3078F DIAST BP <80 MM HG: CPT | Performed by: PHYSICIAN ASSISTANT

## 2023-08-21 PROCEDURE — 3074F SYST BP LT 130 MM HG: CPT | Performed by: PHYSICIAN ASSISTANT

## 2023-08-21 PROCEDURE — 99213 OFFICE O/P EST LOW 20 MIN: CPT | Performed by: PHYSICIAN ASSISTANT

## 2023-08-21 RX ORDER — HYDROXYZINE 50 MG/1
50 TABLET, FILM COATED ORAL 2 TIMES DAILY
COMMUNITY
Start: 2023-06-08 | End: 2023-11-09

## 2023-08-21 RX ORDER — METHOCARBAMOL 750 MG/1
TABLET, FILM COATED ORAL
COMMUNITY
Start: 2023-07-05 | End: 2023-08-21

## 2023-08-21 RX ORDER — METHYLPREDNISOLONE 4 MG/1
TABLET ORAL
COMMUNITY
Start: 2023-07-05 | End: 2023-08-21

## 2023-08-21 RX ORDER — ONDANSETRON HYDROCHLORIDE 8 MG/1
TABLET, FILM COATED ORAL
COMMUNITY
Start: 2023-08-09 | End: 2023-09-11

## 2023-08-21 RX ORDER — METHYLPREDNISOLONE 4 MG/1
TABLET ORAL
Qty: 21 TABLET | Refills: 0 | Status: SHIPPED | OUTPATIENT
Start: 2023-08-21 | End: 2023-09-19

## 2023-08-21 RX ORDER — PANTOPRAZOLE SODIUM 20 MG/1
20 TABLET, DELAYED RELEASE ORAL 2 TIMES DAILY
COMMUNITY
Start: 2023-08-08 | End: 2023-12-05 | Stop reason: SDUPTHER

## 2023-08-21 ASSESSMENT — ENCOUNTER SYMPTOMS
MUSCLE WEAKNESS: 0
TINGLING: 0
FEVER: 0
CHILLS: 0
NUMBNESS: 0

## 2023-08-21 ASSESSMENT — FIBROSIS 4 INDEX: FIB4 SCORE: 0.6

## 2023-08-21 NOTE — PROGRESS NOTES
Subjective:   Sridhar Underwood is a 38 y.o. male who presents today with   Chief Complaint   Patient presents with    Elbow Pain     L elbow pain, constant pain, difficulty moving/lifting, x2 weeks        Arm Pain   The incident occurred more than 1 week ago. The incident occurred at home. There was no injury mechanism. The pain is present in the left elbow. The quality of the pain is described as aching and shooting. The pain is moderate. The pain has been Constant since the incident. Pertinent negatives include no chest pain, muscle weakness, numbness or tingling. He has tried nothing for the symptoms. The treatment provided no relief.     No recent injury or trauma.  Patient states he is also having some right knee pain with no injury.  He states he does have history of gout.    PMH:  has a past medical history of GERD (gastroesophageal reflux disease) and Kidney stone.  MEDS:   Current Outpatient Medications:     hydrOXYzine HCl (ATARAX) 50 MG Tab, Take 50 mg by mouth 2 times a day., Disp: , Rfl:     pantoprazole (PROTONIX) 20 MG tablet, TAKE 1 TABLET BY MOUTH 2 TIMES A DAY. THIS IS A REFILL, Disp: , Rfl:     methylPREDNISolone (MEDROL DOSEPAK) 4 MG Tablet Therapy Pack, Follow schedule on package instructions., Disp: 21 Tablet, Rfl: 0    ondansetron (ZOFRAN) 8 MG Tab, TAKE 8 MG TABLET BY MOUTH 3 TIMES A DAY AS NEEDED (Patient not taking: Reported on 8/21/2023), Disp: , Rfl:   ALLERGIES: No Known Allergies  SURGHX:   Past Surgical History:   Procedure Laterality Date    APPENDECTOMY       SOCHX:  reports that he has quit smoking. His smoking use included cigarettes. He has a 5.00 pack-year smoking history. His smokeless tobacco use includes chew. He reports that he does not currently use alcohol. He reports that he does not currently use drugs after having used the following drugs: Inhaled.  FH: Reviewed with patient, not pertinent to this visit.     Review of Systems   Constitutional:  Negative for chills  "and fever.   Cardiovascular:  Negative for chest pain.   Musculoskeletal:         Left elbow, right knee pain   Neurological:  Negative for tingling and numbness.      Objective:   /78 (BP Location: Right arm, Patient Position: Sitting, BP Cuff Size: Adult)   Pulse 94   Temp 37.2 °C (98.9 °F) (Temporal)   Resp 16   Ht 1.651 m (5' 5\")   Wt 106 kg (234 lb 9.6 oz)   SpO2 97%   BMI 39.04 kg/m²   Physical Exam  Vitals and nursing note reviewed.   Constitutional:       General: He is not in acute distress.     Appearance: Normal appearance. He is well-developed. He is not ill-appearing or toxic-appearing.   HENT:      Head: Normocephalic and atraumatic.      Right Ear: Hearing normal.      Left Ear: Hearing normal.   Cardiovascular:      Rate and Rhythm: Normal rate.   Pulmonary:      Effort: Pulmonary effort is normal.   Musculoskeletal:      Comments: Limited extension of the left elbow secondary to discomfort.  Minimal swelling to the medial and lateral aspect of the elbow but no erythema or effusion.  Mild tenderness to palpation along the epicondyles bilaterally.  Patient has mild swelling to the right knee.  No effusion noted but there is no signs of infection including no erythema or open wounds.  Patient has limited flexion of the right knee secondary to discomfort.  Neurovascular intact distally in the left upper extremity and right lower extremity.   Skin:     General: Skin is warm and dry.   Neurological:      Mental Status: He is alert.      Coordination: Coordination normal.   Psychiatric:         Mood and Affect: Mood normal.       Assessment/Plan:   Assessment    1. Left elbow pain  - methylPREDNISolone (MEDROL DOSEPAK) 4 MG Tablet Therapy Pack; Follow schedule on package instructions.  Dispense: 21 Tablet; Refill: 0    2. Acute pain of right knee  - methylPREDNISolone (MEDROL DOSEPAK) 4 MG Tablet Therapy Pack; Follow schedule on package instructions.  Dispense: 21 Tablet; Refill: 0    Other " orders  - hydrOXYzine HCl (ATARAX) 50 MG Tab; Take 50 mg by mouth 2 times a day.  - ondansetron (ZOFRAN) 8 MG Tab; TAKE 8 MG TABLET BY MOUTH 3 TIMES A DAY AS NEEDED (Patient not taking: Reported on 8/21/2023)  - pantoprazole (PROTONIX) 20 MG tablet; TAKE 1 TABLET BY MOUTH 2 TIMES A DAY. THIS IS A REFILL    Discussed with patient that his symptoms appear to be most consistent with arthritic/gout presentation versus tendinitis or combination of both.  No signs of infection on exam and no indication for imaging at this time.  Would recommend ice and rest to the areas.  Gentle stretching and range of motion exercises.    Differential diagnosis, natural history, supportive care, and indications for immediate follow-up discussed.   Patient given instructions and understanding of medications and treatment.    If not improving in 3-5 days, F/U with PCP or return to UC if symptoms worsen.    Patient agreeable to plan.      Please note that this dictation was created using voice recognition software. I have made every reasonable attempt to correct obvious errors, but I expect that there are errors of grammar and possibly content that I did not discover before finalizing the note.    Everardo Steele PA-C

## 2023-10-30 ENCOUNTER — HOSPITAL ENCOUNTER (EMERGENCY)
Facility: MEDICAL CENTER | Age: 38
End: 2023-10-31
Attending: STUDENT IN AN ORGANIZED HEALTH CARE EDUCATION/TRAINING PROGRAM
Payer: COMMERCIAL

## 2023-10-30 DIAGNOSIS — R07.89 ATYPICAL CHEST PAIN: ICD-10-CM

## 2023-10-30 PROCEDURE — 93005 ELECTROCARDIOGRAM TRACING: CPT | Performed by: STUDENT IN AN ORGANIZED HEALTH CARE EDUCATION/TRAINING PROGRAM

## 2023-10-30 PROCEDURE — 84484 ASSAY OF TROPONIN QUANT: CPT

## 2023-10-30 PROCEDURE — 83690 ASSAY OF LIPASE: CPT

## 2023-10-30 PROCEDURE — 99285 EMERGENCY DEPT VISIT HI MDM: CPT

## 2023-10-30 PROCEDURE — 85025 COMPLETE CBC W/AUTO DIFF WBC: CPT

## 2023-10-30 PROCEDURE — 36415 COLL VENOUS BLD VENIPUNCTURE: CPT

## 2023-10-30 PROCEDURE — 80053 COMPREHEN METABOLIC PANEL: CPT

## 2023-10-30 ASSESSMENT — PAIN DESCRIPTION - PAIN TYPE: TYPE: ACUTE PAIN

## 2023-10-30 ASSESSMENT — FIBROSIS 4 INDEX: FIB4 SCORE: 0.6

## 2023-10-31 ENCOUNTER — APPOINTMENT (OUTPATIENT)
Dept: RADIOLOGY | Facility: MEDICAL CENTER | Age: 38
End: 2023-10-31
Attending: STUDENT IN AN ORGANIZED HEALTH CARE EDUCATION/TRAINING PROGRAM
Payer: COMMERCIAL

## 2023-10-31 VITALS
OXYGEN SATURATION: 89 % | HEART RATE: 66 BPM | SYSTOLIC BLOOD PRESSURE: 102 MMHG | WEIGHT: 227.74 LBS | HEIGHT: 65 IN | TEMPERATURE: 98.2 F | DIASTOLIC BLOOD PRESSURE: 58 MMHG | BODY MASS INDEX: 37.94 KG/M2 | RESPIRATION RATE: 17 BRPM

## 2023-10-31 LAB
ALBUMIN SERPL BCP-MCNC: 4.5 G/DL (ref 3.2–4.9)
ALBUMIN/GLOB SERPL: 1.5 G/DL
ALP SERPL-CCNC: 70 U/L (ref 30–99)
ALT SERPL-CCNC: 29 U/L (ref 2–50)
ANION GAP SERPL CALC-SCNC: 13 MMOL/L (ref 7–16)
AST SERPL-CCNC: 16 U/L (ref 12–45)
BASOPHILS # BLD AUTO: 0.5 % (ref 0–1.8)
BASOPHILS # BLD: 0.06 K/UL (ref 0–0.12)
BILIRUB SERPL-MCNC: 0.6 MG/DL (ref 0.1–1.5)
BUN SERPL-MCNC: 18 MG/DL (ref 8–22)
CALCIUM ALBUM COR SERPL-MCNC: 9 MG/DL (ref 8.5–10.5)
CALCIUM SERPL-MCNC: 9.4 MG/DL (ref 8.5–10.5)
CHLORIDE SERPL-SCNC: 104 MMOL/L (ref 96–112)
CO2 SERPL-SCNC: 22 MMOL/L (ref 20–33)
CREAT SERPL-MCNC: 0.87 MG/DL (ref 0.5–1.4)
EKG IMPRESSION: NORMAL
EOSINOPHIL # BLD AUTO: 0.09 K/UL (ref 0–0.51)
EOSINOPHIL NFR BLD: 0.7 % (ref 0–6.9)
ERYTHROCYTE [DISTWIDTH] IN BLOOD BY AUTOMATED COUNT: 42.4 FL (ref 35.9–50)
GFR SERPLBLD CREATININE-BSD FMLA CKD-EPI: 114 ML/MIN/1.73 M 2
GLOBULIN SER CALC-MCNC: 3.1 G/DL (ref 1.9–3.5)
GLUCOSE SERPL-MCNC: 98 MG/DL (ref 65–99)
HCT VFR BLD AUTO: 46.5 % (ref 42–52)
HGB BLD-MCNC: 15.9 G/DL (ref 14–18)
IMM GRANULOCYTES # BLD AUTO: 0.04 K/UL (ref 0–0.11)
IMM GRANULOCYTES NFR BLD AUTO: 0.3 % (ref 0–0.9)
LIPASE SERPL-CCNC: 23 U/L (ref 11–82)
LYMPHOCYTES # BLD AUTO: 3.25 K/UL (ref 1–4.8)
LYMPHOCYTES NFR BLD: 25.2 % (ref 22–41)
MCH RBC QN AUTO: 29.9 PG (ref 27–33)
MCHC RBC AUTO-ENTMCNC: 34.2 G/DL (ref 32.3–36.5)
MCV RBC AUTO: 87.6 FL (ref 81.4–97.8)
MONOCYTES # BLD AUTO: 1.05 K/UL (ref 0–0.85)
MONOCYTES NFR BLD AUTO: 8.1 % (ref 0–13.4)
NEUTROPHILS # BLD AUTO: 8.43 K/UL (ref 1.82–7.42)
NEUTROPHILS NFR BLD: 65.2 % (ref 44–72)
NRBC # BLD AUTO: 0 K/UL
NRBC BLD-RTO: 0 /100 WBC (ref 0–0.2)
PLATELET # BLD AUTO: 262 K/UL (ref 164–446)
PMV BLD AUTO: 10.4 FL (ref 9–12.9)
POTASSIUM SERPL-SCNC: 3.8 MMOL/L (ref 3.6–5.5)
PROT SERPL-MCNC: 7.6 G/DL (ref 6–8.2)
RBC # BLD AUTO: 5.31 M/UL (ref 4.7–6.1)
SODIUM SERPL-SCNC: 139 MMOL/L (ref 135–145)
TROPONIN T SERPL-MCNC: <6 NG/L (ref 6–19)
TROPONIN T SERPL-MCNC: <6 NG/L (ref 6–19)
WBC # BLD AUTO: 12.9 K/UL (ref 4.8–10.8)

## 2023-10-31 PROCEDURE — 71045 X-RAY EXAM CHEST 1 VIEW: CPT

## 2023-10-31 PROCEDURE — 96374 THER/PROPH/DIAG INJ IV PUSH: CPT

## 2023-10-31 PROCEDURE — 84484 ASSAY OF TROPONIN QUANT: CPT

## 2023-10-31 PROCEDURE — 36415 COLL VENOUS BLD VENIPUNCTURE: CPT

## 2023-10-31 PROCEDURE — 76705 ECHO EXAM OF ABDOMEN: CPT

## 2023-10-31 PROCEDURE — 74177 CT ABD & PELVIS W/CONTRAST: CPT

## 2023-10-31 PROCEDURE — 700102 HCHG RX REV CODE 250 W/ 637 OVERRIDE(OP): Performed by: STUDENT IN AN ORGANIZED HEALTH CARE EDUCATION/TRAINING PROGRAM

## 2023-10-31 PROCEDURE — 700117 HCHG RX CONTRAST REV CODE 255: Performed by: STUDENT IN AN ORGANIZED HEALTH CARE EDUCATION/TRAINING PROGRAM

## 2023-10-31 PROCEDURE — 700111 HCHG RX REV CODE 636 W/ 250 OVERRIDE (IP): Mod: JZ | Performed by: STUDENT IN AN ORGANIZED HEALTH CARE EDUCATION/TRAINING PROGRAM

## 2023-10-31 PROCEDURE — A9270 NON-COVERED ITEM OR SERVICE: HCPCS | Performed by: STUDENT IN AN ORGANIZED HEALTH CARE EDUCATION/TRAINING PROGRAM

## 2023-10-31 PROCEDURE — 700105 HCHG RX REV CODE 258: Performed by: STUDENT IN AN ORGANIZED HEALTH CARE EDUCATION/TRAINING PROGRAM

## 2023-10-31 PROCEDURE — 700101 HCHG RX REV CODE 250: Performed by: STUDENT IN AN ORGANIZED HEALTH CARE EDUCATION/TRAINING PROGRAM

## 2023-10-31 RX ORDER — ACETAMINOPHEN 500 MG
1000 TABLET ORAL ONCE
Status: COMPLETED | OUTPATIENT
Start: 2023-10-31 | End: 2023-10-31

## 2023-10-31 RX ORDER — LIDOCAINE 50 MG/G
1 PATCH TOPICAL EVERY 24 HOURS
Status: DISCONTINUED | OUTPATIENT
Start: 2023-10-31 | End: 2023-10-31 | Stop reason: HOSPADM

## 2023-10-31 RX ORDER — SODIUM CHLORIDE, SODIUM LACTATE, POTASSIUM CHLORIDE, CALCIUM CHLORIDE 600; 310; 30; 20 MG/100ML; MG/100ML; MG/100ML; MG/100ML
1000 INJECTION, SOLUTION INTRAVENOUS ONCE
Status: COMPLETED | OUTPATIENT
Start: 2023-10-31 | End: 2023-10-31

## 2023-10-31 RX ORDER — ONDANSETRON 2 MG/ML
4 INJECTION INTRAMUSCULAR; INTRAVENOUS ONCE
Status: COMPLETED | OUTPATIENT
Start: 2023-10-31 | End: 2023-10-31

## 2023-10-31 RX ADMIN — SODIUM CHLORIDE, POTASSIUM CHLORIDE, SODIUM LACTATE AND CALCIUM CHLORIDE 1000 ML: 600; 310; 30; 20 INJECTION, SOLUTION INTRAVENOUS at 00:32

## 2023-10-31 RX ADMIN — IOHEXOL 100 ML: 350 INJECTION, SOLUTION INTRAVENOUS at 03:00

## 2023-10-31 RX ADMIN — LIDOCAINE 1 PATCH: 50 PATCH CUTANEOUS at 00:30

## 2023-10-31 RX ADMIN — ACETAMINOPHEN 1000 MG: 500 TABLET, FILM COATED ORAL at 00:30

## 2023-10-31 RX ADMIN — ONDANSETRON 4 MG: 2 INJECTION INTRAMUSCULAR; INTRAVENOUS at 00:32

## 2023-10-31 RX ADMIN — LIDOCAINE HYDROCHLORIDE 30 ML: 20 SOLUTION OROPHARYNGEAL at 00:31

## 2023-10-31 ASSESSMENT — HEART SCORE
HEART SCORE: 2
RISK FACTORS: 1-2 RISK FACTORS
ECG: NON-SPECIFIC REPOLARIZATION DISTURBANCE
HISTORY: SLIGHTLY SUSPICIOUS
TROPONIN: LESS THAN OR EQUAL TO NORMAL LIMIT
AGE: <45

## 2023-10-31 NOTE — ED NOTES
Patient peripheral IV placed at right AC 20g for CT, blood specimen for repeat troponin collected and send to lab.
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
## 2023-10-31 NOTE — ED NOTES
Pt discharged . GCS 15. IV discontinued and gauze placed, pt in possession of belongings. Pt provided discharge education and information pertaining to medications and follow up appointments. Pt received copy of discharge instructions and verbalized understanding.

## 2023-10-31 NOTE — DISCHARGE INSTRUCTIONS
Stop taking Motrin, this can make your symptoms worse.  Start taking Tums or Maalox when you develop pain, follow-up with your primary care doctor to discuss further work-up as indicated, they may consider gastroenterology referral, echocardiography, stress test depending on their findings at time of your evaluation.  Turn if you develop severe worsening pain, vomiting, black tarry stools, other new symptoms you find concerning.

## 2023-10-31 NOTE — ED TRIAGE NOTES
"Sridhar Underwood  38 y.o.  male  Chief Complaint   Patient presents with    Abdominal Pain     Pt BIBA from home for c/o abdominal pain, N/V x 3 days, worse today. Pt reports \"non-functioning\" gallbladder. HIDA scan 10/09.   +gallbladder, -appendix     Chest Pain     Pt reports intermittent R sided CP with deep breaths, onset today. Pt denies CP at this time. -trauma     Pt reports he is scheduled for a consultation on Wednesday to get his gallbladder removed. Pt also reports hx of GERD w/ bloody sputum x 3 months.     Pt given 4mg Zofran by EMS.     Pt to triage for above compliant. Abdominal pain protocol ordered.     Pt placed in lobby and educated on triage process. Pt encouraged to alert staff for any changes, pt verbalized understanding.     BP (!) 141/94   Pulse 83   Temp 36.3 °C (97.3 °F) (Temporal)   Resp 17   Ht 1.651 m (5' 5\")   Wt 103 kg (227 lb 11.8 oz)   SpO2 94%   BMI 37.90 kg/m²     "

## 2023-10-31 NOTE — ED NOTES
Pt ambulated to Red 2 from lobby with steady gait.  On monitor, call light in reach. Chart up for ERP.

## 2023-11-02 ENCOUNTER — APPOINTMENT (OUTPATIENT)
Dept: ADMISSIONS | Facility: MEDICAL CENTER | Age: 38
End: 2023-11-02
Attending: SURGERY
Payer: COMMERCIAL

## 2023-11-09 ENCOUNTER — PRE-ADMISSION TESTING (OUTPATIENT)
Dept: ADMISSIONS | Facility: MEDICAL CENTER | Age: 38
End: 2023-11-09
Attending: SURGERY
Payer: COMMERCIAL

## 2023-11-09 NOTE — OR NURSING
When I spoke to Sridhar this morning he told me that he is not feeling well at all.  He reports stomach upset, nausea, and has not been eating anything.  Called Dr Keenan, and reported the above.  Sridhar will call and follow up with Jacki, surgery scheduler.  Randa know to go to er or urgent care if starts to feel worse.

## 2023-11-27 ENCOUNTER — HOSPITAL ENCOUNTER (EMERGENCY)
Facility: MEDICAL CENTER | Age: 38
End: 2023-11-27
Attending: EMERGENCY MEDICINE
Payer: COMMERCIAL

## 2023-11-27 ENCOUNTER — ANESTHESIA EVENT (OUTPATIENT)
Dept: SURGERY | Facility: MEDICAL CENTER | Age: 38
End: 2023-11-27
Payer: COMMERCIAL

## 2023-11-27 VITALS
BODY MASS INDEX: 37.32 KG/M2 | HEART RATE: 62 BPM | OXYGEN SATURATION: 95 % | HEIGHT: 65 IN | TEMPERATURE: 97.5 F | DIASTOLIC BLOOD PRESSURE: 84 MMHG | WEIGHT: 224 LBS | SYSTOLIC BLOOD PRESSURE: 129 MMHG | RESPIRATION RATE: 20 BRPM

## 2023-11-27 DIAGNOSIS — K82.8 BILIARY DYSKINESIA: ICD-10-CM

## 2023-11-27 DIAGNOSIS — R11.2 NAUSEA AND VOMITING, UNSPECIFIED VOMITING TYPE: ICD-10-CM

## 2023-11-27 LAB
ALBUMIN SERPL BCP-MCNC: 4.1 G/DL (ref 3.2–4.9)
ALBUMIN/GLOB SERPL: 1.6 G/DL
ALP SERPL-CCNC: 63 U/L (ref 30–99)
ALT SERPL-CCNC: 34 U/L (ref 2–50)
ANION GAP SERPL CALC-SCNC: 9 MMOL/L (ref 7–16)
AST SERPL-CCNC: 21 U/L (ref 12–45)
BASOPHILS # BLD AUTO: 0.3 % (ref 0–1.8)
BASOPHILS # BLD: 0.03 K/UL (ref 0–0.12)
BILIRUB SERPL-MCNC: 0.5 MG/DL (ref 0.1–1.5)
BUN SERPL-MCNC: 10 MG/DL (ref 8–22)
CALCIUM ALBUM COR SERPL-MCNC: 8.6 MG/DL (ref 8.5–10.5)
CALCIUM SERPL-MCNC: 8.7 MG/DL (ref 8.5–10.5)
CHLORIDE SERPL-SCNC: 105 MMOL/L (ref 96–112)
CO2 SERPL-SCNC: 23 MMOL/L (ref 20–33)
CREAT SERPL-MCNC: 0.73 MG/DL (ref 0.5–1.4)
EOSINOPHIL # BLD AUTO: 0.1 K/UL (ref 0–0.51)
EOSINOPHIL NFR BLD: 1.1 % (ref 0–6.9)
ERYTHROCYTE [DISTWIDTH] IN BLOOD BY AUTOMATED COUNT: 43.4 FL (ref 35.9–50)
GFR SERPLBLD CREATININE-BSD FMLA CKD-EPI: 120 ML/MIN/1.73 M 2
GLOBULIN SER CALC-MCNC: 2.6 G/DL (ref 1.9–3.5)
GLUCOSE SERPL-MCNC: 106 MG/DL (ref 65–99)
HCT VFR BLD AUTO: 41.8 % (ref 42–52)
HGB BLD-MCNC: 14 G/DL (ref 14–18)
IMM GRANULOCYTES # BLD AUTO: 0.04 K/UL (ref 0–0.11)
IMM GRANULOCYTES NFR BLD AUTO: 0.4 % (ref 0–0.9)
LIPASE SERPL-CCNC: 18 U/L (ref 11–82)
LYMPHOCYTES # BLD AUTO: 2.13 K/UL (ref 1–4.8)
LYMPHOCYTES NFR BLD: 23.8 % (ref 22–41)
MCH RBC QN AUTO: 30 PG (ref 27–33)
MCHC RBC AUTO-ENTMCNC: 33.5 G/DL (ref 32.3–36.5)
MCV RBC AUTO: 89.7 FL (ref 81.4–97.8)
MONOCYTES # BLD AUTO: 0.73 K/UL (ref 0–0.85)
MONOCYTES NFR BLD AUTO: 8.2 % (ref 0–13.4)
NEUTROPHILS # BLD AUTO: 5.91 K/UL (ref 1.82–7.42)
NEUTROPHILS NFR BLD: 66.2 % (ref 44–72)
NRBC # BLD AUTO: 0 K/UL
NRBC BLD-RTO: 0 /100 WBC (ref 0–0.2)
PLATELET # BLD AUTO: 204 K/UL (ref 164–446)
PMV BLD AUTO: 10.7 FL (ref 9–12.9)
POTASSIUM SERPL-SCNC: 4 MMOL/L (ref 3.6–5.5)
PROT SERPL-MCNC: 6.7 G/DL (ref 6–8.2)
RBC # BLD AUTO: 4.66 M/UL (ref 4.7–6.1)
SODIUM SERPL-SCNC: 137 MMOL/L (ref 135–145)
WBC # BLD AUTO: 8.9 K/UL (ref 4.8–10.8)

## 2023-11-27 PROCEDURE — 99285 EMERGENCY DEPT VISIT HI MDM: CPT

## 2023-11-27 PROCEDURE — A9270 NON-COVERED ITEM OR SERVICE: HCPCS | Performed by: EMERGENCY MEDICINE

## 2023-11-27 PROCEDURE — 36415 COLL VENOUS BLD VENIPUNCTURE: CPT

## 2023-11-27 PROCEDURE — 80053 COMPREHEN METABOLIC PANEL: CPT

## 2023-11-27 PROCEDURE — 700102 HCHG RX REV CODE 250 W/ 637 OVERRIDE(OP): Performed by: EMERGENCY MEDICINE

## 2023-11-27 PROCEDURE — 96374 THER/PROPH/DIAG INJ IV PUSH: CPT

## 2023-11-27 PROCEDURE — 83690 ASSAY OF LIPASE: CPT

## 2023-11-27 PROCEDURE — 700111 HCHG RX REV CODE 636 W/ 250 OVERRIDE (IP): Mod: JZ | Performed by: EMERGENCY MEDICINE

## 2023-11-27 PROCEDURE — 85025 COMPLETE CBC W/AUTO DIFF WBC: CPT

## 2023-11-27 RX ADMIN — FAMOTIDINE 20 MG: 10 INJECTION, SOLUTION INTRAVENOUS at 11:41

## 2023-11-27 RX ADMIN — LIDOCAINE HYDROCHLORIDE 30 ML: 20 SOLUTION OROPHARYNGEAL at 12:06

## 2023-11-27 ASSESSMENT — FIBROSIS 4 INDEX: FIB4 SCORE: 0.42

## 2023-11-27 NOTE — ED NOTES
Discharge instructions given to pt. Pt verbalized understanding. All questions have been addressed. Discharged pt in stable condition. PIV removed.

## 2023-11-27 NOTE — ED TRIAGE NOTES
"Chief Complaint   Patient presents with    Abdominal Pain    N/V     BIBA with RUQ pain  -on and off. Pt has history of gallbladder stone and was scheduled for surgery tomorrow.  Nausea/Vomiting  -started at 0700 this morning    Given 1 liter Bolus of NS and 8mg Zofran en route         BP (!) 155/86   Pulse (!) 56   Temp 36.9 °C (98.4 °F) (Temporal)   Resp 20   Ht 1.651 m (5' 5\")   Wt 102 kg (224 lb)   SpO2 95%   BMI 37.28 kg/m²     "

## 2023-11-27 NOTE — ED NOTES
PT did not feel up to walking to restroom at this time so he was provided a urinal to void his urine.
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
## 2023-11-28 ENCOUNTER — HOSPITAL ENCOUNTER (OUTPATIENT)
Facility: MEDICAL CENTER | Age: 38
End: 2023-11-28
Attending: SURGERY | Admitting: SURGERY
Payer: COMMERCIAL

## 2023-11-28 ENCOUNTER — ANESTHESIA (OUTPATIENT)
Dept: SURGERY | Facility: MEDICAL CENTER | Age: 38
End: 2023-11-28
Payer: COMMERCIAL

## 2023-11-28 VITALS
DIASTOLIC BLOOD PRESSURE: 74 MMHG | HEART RATE: 82 BPM | WEIGHT: 229.94 LBS | SYSTOLIC BLOOD PRESSURE: 137 MMHG | HEIGHT: 66 IN | OXYGEN SATURATION: 95 % | RESPIRATION RATE: 16 BRPM | TEMPERATURE: 97.2 F | BODY MASS INDEX: 36.95 KG/M2

## 2023-11-28 DIAGNOSIS — G89.18 POSTOPERATIVE PAIN: ICD-10-CM

## 2023-11-28 LAB — PATHOLOGY CONSULT NOTE: NORMAL

## 2023-11-28 PROCEDURE — 700111 HCHG RX REV CODE 636 W/ 250 OVERRIDE (IP): Mod: JZ | Performed by: ANESTHESIOLOGY

## 2023-11-28 PROCEDURE — 160029 HCHG SURGERY MINUTES - 1ST 30 MINS LEVEL 4: Performed by: SURGERY

## 2023-11-28 PROCEDURE — 700101 HCHG RX REV CODE 250: Performed by: ANESTHESIOLOGY

## 2023-11-28 PROCEDURE — 160035 HCHG PACU - 1ST 60 MINS PHASE I: Performed by: SURGERY

## 2023-11-28 PROCEDURE — 700105 HCHG RX REV CODE 258: Performed by: SURGERY

## 2023-11-28 PROCEDURE — 160025 RECOVERY II MINUTES (STATS): Performed by: SURGERY

## 2023-11-28 PROCEDURE — 160002 HCHG RECOVERY MINUTES (STAT): Performed by: SURGERY

## 2023-11-28 PROCEDURE — 160009 HCHG ANES TIME/MIN: Performed by: SURGERY

## 2023-11-28 PROCEDURE — 160041 HCHG SURGERY MINUTES - EA ADDL 1 MIN LEVEL 4: Performed by: SURGERY

## 2023-11-28 PROCEDURE — 160046 HCHG PACU - 1ST 60 MINS PHASE II: Performed by: SURGERY

## 2023-11-28 PROCEDURE — A9270 NON-COVERED ITEM OR SERVICE: HCPCS | Performed by: ANESTHESIOLOGY

## 2023-11-28 PROCEDURE — 700111 HCHG RX REV CODE 636 W/ 250 OVERRIDE (IP): Performed by: ANESTHESIOLOGY

## 2023-11-28 PROCEDURE — 88304 TISSUE EXAM BY PATHOLOGIST: CPT

## 2023-11-28 PROCEDURE — 700102 HCHG RX REV CODE 250 W/ 637 OVERRIDE(OP): Performed by: ANESTHESIOLOGY

## 2023-11-28 PROCEDURE — 700101 HCHG RX REV CODE 250: Performed by: SURGERY

## 2023-11-28 PROCEDURE — 160048 HCHG OR STATISTICAL LEVEL 1-5: Performed by: SURGERY

## 2023-11-28 RX ORDER — MIDAZOLAM HYDROCHLORIDE 1 MG/ML
1 INJECTION INTRAMUSCULAR; INTRAVENOUS
Status: DISCONTINUED | OUTPATIENT
Start: 2023-11-28 | End: 2023-11-28 | Stop reason: HOSPADM

## 2023-11-28 RX ORDER — DEXAMETHASONE SODIUM PHOSPHATE 4 MG/ML
INJECTION, SOLUTION INTRA-ARTICULAR; INTRALESIONAL; INTRAMUSCULAR; INTRAVENOUS; SOFT TISSUE PRN
Status: DISCONTINUED | OUTPATIENT
Start: 2023-11-28 | End: 2023-11-28 | Stop reason: SURG

## 2023-11-28 RX ORDER — ACETAMINOPHEN 325 MG/1
650 TABLET ORAL EVERY 6 HOURS
Qty: 60 TABLET | Refills: 0 | Status: SHIPPED | OUTPATIENT
Start: 2023-11-28 | End: 2023-12-05

## 2023-11-28 RX ORDER — ROCURONIUM BROMIDE 10 MG/ML
INJECTION, SOLUTION INTRAVENOUS PRN
Status: DISCONTINUED | OUTPATIENT
Start: 2023-11-28 | End: 2023-11-28 | Stop reason: SURG

## 2023-11-28 RX ORDER — HYDROMORPHONE HYDROCHLORIDE 1 MG/ML
0.4 INJECTION, SOLUTION INTRAMUSCULAR; INTRAVENOUS; SUBCUTANEOUS
Status: DISCONTINUED | OUTPATIENT
Start: 2023-11-28 | End: 2023-11-28 | Stop reason: HOSPADM

## 2023-11-28 RX ORDER — METOPROLOL TARTRATE 1 MG/ML
INJECTION, SOLUTION INTRAVENOUS PRN
Status: DISCONTINUED | OUTPATIENT
Start: 2023-11-28 | End: 2023-11-28 | Stop reason: SURG

## 2023-11-28 RX ORDER — HYDRALAZINE HYDROCHLORIDE 20 MG/ML
5 INJECTION INTRAMUSCULAR; INTRAVENOUS
Status: DISCONTINUED | OUTPATIENT
Start: 2023-11-28 | End: 2023-11-28 | Stop reason: HOSPADM

## 2023-11-28 RX ORDER — ACETAMINOPHEN 500 MG
1000 TABLET ORAL EVERY 6 HOURS PRN
Status: ON HOLD | COMMUNITY
End: 2023-11-28

## 2023-11-28 RX ORDER — SCOLOPAMINE TRANSDERMAL SYSTEM 1 MG/1
1 PATCH, EXTENDED RELEASE TRANSDERMAL
Status: DISCONTINUED | OUTPATIENT
Start: 2023-11-28 | End: 2023-11-28 | Stop reason: HOSPADM

## 2023-11-28 RX ORDER — SODIUM CHLORIDE, SODIUM LACTATE, POTASSIUM CHLORIDE, CALCIUM CHLORIDE 600; 310; 30; 20 MG/100ML; MG/100ML; MG/100ML; MG/100ML
INJECTION, SOLUTION INTRAVENOUS CONTINUOUS
Status: DISCONTINUED | OUTPATIENT
Start: 2023-11-28 | End: 2023-11-28 | Stop reason: HOSPADM

## 2023-11-28 RX ORDER — ALBUTEROL SULFATE 2.5 MG/3ML
2.5 SOLUTION RESPIRATORY (INHALATION)
Status: DISCONTINUED | OUTPATIENT
Start: 2023-11-28 | End: 2023-11-28 | Stop reason: HOSPADM

## 2023-11-28 RX ORDER — HYDROMORPHONE HYDROCHLORIDE 1 MG/ML
0.2 INJECTION, SOLUTION INTRAMUSCULAR; INTRAVENOUS; SUBCUTANEOUS
Status: DISCONTINUED | OUTPATIENT
Start: 2023-11-28 | End: 2023-11-28 | Stop reason: HOSPADM

## 2023-11-28 RX ORDER — GLYCOPYRROLATE 0.2 MG/ML
INJECTION INTRAMUSCULAR; INTRAVENOUS PRN
Status: DISCONTINUED | OUTPATIENT
Start: 2023-11-28 | End: 2023-11-28 | Stop reason: SURG

## 2023-11-28 RX ORDER — KETOROLAC TROMETHAMINE 30 MG/ML
INJECTION, SOLUTION INTRAMUSCULAR; INTRAVENOUS PRN
Status: DISCONTINUED | OUTPATIENT
Start: 2023-11-28 | End: 2023-11-28 | Stop reason: SURG

## 2023-11-28 RX ORDER — IBUPROFEN 600 MG/1
600 TABLET ORAL EVERY 6 HOURS
Qty: 45 TABLET | Refills: 0 | Status: SHIPPED | OUTPATIENT
Start: 2023-11-28 | End: 2023-12-05

## 2023-11-28 RX ORDER — POLYETHYLENE GLYCOL 3350 17 G/17G
17 POWDER, FOR SOLUTION ORAL DAILY
Qty: 20 EACH | Refills: 0 | Status: SHIPPED | OUTPATIENT
Start: 2023-11-28 | End: 2023-12-05

## 2023-11-28 RX ORDER — ONDANSETRON 2 MG/ML
INJECTION INTRAMUSCULAR; INTRAVENOUS PRN
Status: DISCONTINUED | OUTPATIENT
Start: 2023-11-28 | End: 2023-11-28 | Stop reason: SURG

## 2023-11-28 RX ORDER — METOCLOPRAMIDE HYDROCHLORIDE 5 MG/ML
INJECTION INTRAMUSCULAR; INTRAVENOUS PRN
Status: DISCONTINUED | OUTPATIENT
Start: 2023-11-28 | End: 2023-11-28 | Stop reason: SURG

## 2023-11-28 RX ORDER — BUPIVACAINE HYDROCHLORIDE AND EPINEPHRINE 5; 5 MG/ML; UG/ML
INJECTION, SOLUTION EPIDURAL; INTRACAUDAL; PERINEURAL
Status: DISCONTINUED | OUTPATIENT
Start: 2023-11-28 | End: 2023-11-28 | Stop reason: HOSPADM

## 2023-11-28 RX ORDER — CEFAZOLIN SODIUM 1 G/3ML
INJECTION, POWDER, FOR SOLUTION INTRAMUSCULAR; INTRAVENOUS PRN
Status: DISCONTINUED | OUTPATIENT
Start: 2023-11-28 | End: 2023-11-28 | Stop reason: SURG

## 2023-11-28 RX ORDER — HYDROMORPHONE HYDROCHLORIDE 2 MG/ML
INJECTION, SOLUTION INTRAMUSCULAR; INTRAVENOUS; SUBCUTANEOUS PRN
Status: DISCONTINUED | OUTPATIENT
Start: 2023-11-28 | End: 2023-11-28 | Stop reason: SURG

## 2023-11-28 RX ORDER — OXYCODONE HCL 5 MG/5 ML
5 SOLUTION, ORAL ORAL
Status: COMPLETED | OUTPATIENT
Start: 2023-11-28 | End: 2023-11-28

## 2023-11-28 RX ORDER — HYDROMORPHONE HYDROCHLORIDE 1 MG/ML
0.1 INJECTION, SOLUTION INTRAMUSCULAR; INTRAVENOUS; SUBCUTANEOUS
Status: DISCONTINUED | OUTPATIENT
Start: 2023-11-28 | End: 2023-11-28 | Stop reason: HOSPADM

## 2023-11-28 RX ORDER — ONDANSETRON 2 MG/ML
4 INJECTION INTRAMUSCULAR; INTRAVENOUS
Status: COMPLETED | OUTPATIENT
Start: 2023-11-28 | End: 2023-11-28

## 2023-11-28 RX ORDER — DIPHENHYDRAMINE HYDROCHLORIDE 50 MG/ML
12.5 INJECTION INTRAMUSCULAR; INTRAVENOUS
Status: DISCONTINUED | OUTPATIENT
Start: 2023-11-28 | End: 2023-11-28 | Stop reason: HOSPADM

## 2023-11-28 RX ORDER — ACETAMINOPHEN 500 MG
1000 TABLET ORAL ONCE
Status: COMPLETED | OUTPATIENT
Start: 2023-11-28 | End: 2023-11-28

## 2023-11-28 RX ORDER — HALOPERIDOL 5 MG/ML
1 INJECTION INTRAMUSCULAR
Status: DISCONTINUED | OUTPATIENT
Start: 2023-11-28 | End: 2023-11-28 | Stop reason: HOSPADM

## 2023-11-28 RX ORDER — EPHEDRINE SULFATE 50 MG/ML
5 INJECTION, SOLUTION INTRAVENOUS
Status: DISCONTINUED | OUTPATIENT
Start: 2023-11-28 | End: 2023-11-28 | Stop reason: HOSPADM

## 2023-11-28 RX ORDER — OXYCODONE HYDROCHLORIDE 5 MG/1
5 TABLET ORAL EVERY 4 HOURS PRN
Qty: 12 TABLET | Refills: 0 | Status: SHIPPED | OUTPATIENT
Start: 2023-11-28 | End: 2023-12-01

## 2023-11-28 RX ORDER — MIDAZOLAM HYDROCHLORIDE 1 MG/ML
INJECTION INTRAMUSCULAR; INTRAVENOUS PRN
Status: DISCONTINUED | OUTPATIENT
Start: 2023-11-28 | End: 2023-11-28 | Stop reason: SURG

## 2023-11-28 RX ORDER — OXYCODONE HCL 5 MG/5 ML
10 SOLUTION, ORAL ORAL
Status: COMPLETED | OUTPATIENT
Start: 2023-11-28 | End: 2023-11-28

## 2023-11-28 RX ORDER — MEPERIDINE HYDROCHLORIDE 25 MG/ML
12.5 INJECTION INTRAMUSCULAR; INTRAVENOUS; SUBCUTANEOUS
Status: DISCONTINUED | OUTPATIENT
Start: 2023-11-28 | End: 2023-11-28 | Stop reason: HOSPADM

## 2023-11-28 RX ORDER — DIPHENHYDRAMINE HYDROCHLORIDE 50 MG/ML
INJECTION INTRAMUSCULAR; INTRAVENOUS PRN
Status: DISCONTINUED | OUTPATIENT
Start: 2023-11-28 | End: 2023-11-28 | Stop reason: SURG

## 2023-11-28 RX ORDER — LIDOCAINE HYDROCHLORIDE 20 MG/ML
INJECTION, SOLUTION EPIDURAL; INFILTRATION; INTRACAUDAL; PERINEURAL PRN
Status: DISCONTINUED | OUTPATIENT
Start: 2023-11-28 | End: 2023-11-28 | Stop reason: SURG

## 2023-11-28 RX ADMIN — FENTANYL CITRATE 25 MCG: 50 INJECTION, SOLUTION INTRAMUSCULAR; INTRAVENOUS at 12:24

## 2023-11-28 RX ADMIN — ONDANSETRON 4 MG: 2 INJECTION INTRAMUSCULAR; INTRAVENOUS at 11:33

## 2023-11-28 RX ADMIN — PROPOFOL 225 MG: 10 INJECTION, EMULSION INTRAVENOUS at 11:21

## 2023-11-28 RX ADMIN — OXYCODONE HYDROCHLORIDE 10 MG: 5 SOLUTION ORAL at 12:13

## 2023-11-28 RX ADMIN — ROCURONIUM BROMIDE 50 MG: 50 INJECTION, SOLUTION INTRAVENOUS at 11:21

## 2023-11-28 RX ADMIN — KETOROLAC TROMETHAMINE 30 MG: 30 INJECTION, SOLUTION INTRAMUSCULAR; INTRAVENOUS at 12:11

## 2023-11-28 RX ADMIN — SCOPOLAMINE 1 PATCH: 1.5 PATCH, EXTENDED RELEASE TRANSDERMAL at 10:13

## 2023-11-28 RX ADMIN — FENTANYL CITRATE 50 MCG: 50 INJECTION, SOLUTION INTRAMUSCULAR; INTRAVENOUS at 11:37

## 2023-11-28 RX ADMIN — HYDROMORPHONE HYDROCHLORIDE 0.6 MG: 2 INJECTION INTRAMUSCULAR; INTRAVENOUS; SUBCUTANEOUS at 12:08

## 2023-11-28 RX ADMIN — FENTANYL CITRATE 50 MCG: 50 INJECTION, SOLUTION INTRAMUSCULAR; INTRAVENOUS at 11:29

## 2023-11-28 RX ADMIN — MIDAZOLAM HYDROCHLORIDE 1 MG: 1 INJECTION, SOLUTION INTRAMUSCULAR; INTRAVENOUS at 11:16

## 2023-11-28 RX ADMIN — SODIUM CHLORIDE, POTASSIUM CHLORIDE, SODIUM LACTATE AND CALCIUM CHLORIDE: 600; 310; 30; 20 INJECTION, SOLUTION INTRAVENOUS at 11:50

## 2023-11-28 RX ADMIN — SUGAMMADEX 200 MG: 100 INJECTION, SOLUTION INTRAVENOUS at 12:04

## 2023-11-28 RX ADMIN — METOPROLOL TARTRATE 2.5 MG: 5 INJECTION INTRAVENOUS at 11:48

## 2023-11-28 RX ADMIN — ACETAMINOPHEN 1000 MG: 500 TABLET, FILM COATED ORAL at 10:18

## 2023-11-28 RX ADMIN — HYDROMORPHONE HYDROCHLORIDE 0.4 MG: 2 INJECTION INTRAMUSCULAR; INTRAVENOUS; SUBCUTANEOUS at 11:38

## 2023-11-28 RX ADMIN — HYDROMORPHONE HYDROCHLORIDE 0.5 MG: 2 INJECTION INTRAMUSCULAR; INTRAVENOUS; SUBCUTANEOUS at 12:10

## 2023-11-28 RX ADMIN — GLYCOPYRROLATE 0.3 MG: 0.2 INJECTION INTRAMUSCULAR; INTRAVENOUS at 11:18

## 2023-11-28 RX ADMIN — METOPROLOL TARTRATE 2.5 MG: 5 INJECTION INTRAVENOUS at 11:42

## 2023-11-28 RX ADMIN — PROPOFOL 50 MCG/KG/MIN: 10 INJECTION, EMULSION INTRAVENOUS at 11:24

## 2023-11-28 RX ADMIN — FENTANYL CITRATE 100 MCG: 50 INJECTION, SOLUTION INTRAMUSCULAR; INTRAVENOUS at 11:17

## 2023-11-28 RX ADMIN — HYDROMORPHONE HYDROCHLORIDE 0.5 MG: 2 INJECTION INTRAMUSCULAR; INTRAVENOUS; SUBCUTANEOUS at 12:12

## 2023-11-28 RX ADMIN — ONDANSETRON 4 MG: 2 INJECTION INTRAMUSCULAR; INTRAVENOUS at 12:19

## 2023-11-28 RX ADMIN — DIPHENHYDRAMINE HYDROCHLORIDE 10 MG: 50 INJECTION, SOLUTION INTRAMUSCULAR; INTRAVENOUS at 11:26

## 2023-11-28 RX ADMIN — LIDOCAINE HYDROCHLORIDE 100 MG: 20 INJECTION, SOLUTION EPIDURAL; INFILTRATION; INTRACAUDAL at 11:21

## 2023-11-28 RX ADMIN — SODIUM CHLORIDE, POTASSIUM CHLORIDE, SODIUM LACTATE AND CALCIUM CHLORIDE: 600; 310; 30; 20 INJECTION, SOLUTION INTRAVENOUS at 10:18

## 2023-11-28 RX ADMIN — FENTANYL CITRATE 50 MCG: 50 INJECTION, SOLUTION INTRAMUSCULAR; INTRAVENOUS at 12:08

## 2023-11-28 RX ADMIN — CEFAZOLIN 2 G: 1 INJECTION, POWDER, FOR SOLUTION INTRAMUSCULAR; INTRAVENOUS at 11:22

## 2023-11-28 RX ADMIN — METOCLOPRAMIDE 10 MG: 5 INJECTION, SOLUTION INTRAMUSCULAR; INTRAVENOUS at 11:24

## 2023-11-28 RX ADMIN — DEXAMETHASONE SODIUM PHOSPHATE 8 MG: 4 INJECTION INTRA-ARTICULAR; INTRALESIONAL; INTRAMUSCULAR; INTRAVENOUS; SOFT TISSUE at 11:24

## 2023-11-28 ASSESSMENT — PAIN SCALES - GENERAL: PAIN_LEVEL: 3

## 2023-11-28 ASSESSMENT — PAIN DESCRIPTION - PAIN TYPE
TYPE: SURGICAL PAIN

## 2023-11-28 ASSESSMENT — FIBROSIS 4 INDEX: FIB4 SCORE: 0.65

## 2023-11-28 NOTE — OP REPORT
Operative Report    Date: 11/28/2023    Surgeon: Danny Keenan M.D.     Assistant: Andrew Herbert PA-C    Pre-operative Diagnosis: Biliary Dyskinesia    Post-operative Diagnosis: Same    ASA Classification: II.    Procedure: Laparoscopic cholecystectomy    Indications: This is a 38 y.o. male who presented with symptoms of biliary dyskinesia here for cholecystectomy.      The indications for a surgical assistant in this surgery were indicated due to complexity of the procedure. Their role included aiding in incision, retraction, holding devices including camera for laparoscopic procedure, and closure of the wound.      Wound Classification: Class II, II, Clean Contaminated..    Findings: Critical view of safety obtained    Procedure in detail: The patient was seen and examined in the preoperative holding area.  The risks benefits and alternatives of the procedure were discussed with the patient who wished to proceed with the procedure as described.  The patient was transferred to the operating room placed in supine position and all pressure points were properly padded.  General endotracheal anesthesia was induced and preoperative antibiotics were given per SCIP protocol.  Patient's abdomen was prepped with ChloraPrep and draped in the normal sterile fashion.  A timeout was performed confirming correct patient, correct procedure, and that all necessary equipment was in the room.      After infiltration of local anesthetic, an incision was made in the supraumbilical position.  A combination of blunt and electrocautery dissection was used down to the fascia.  The umbilical stalk was grasped elevated and the fascia was sharply incised.  A figure-of-eight 0 vicryl suture was placed across the fascial opening.  The Villanueva port was introduced and pneumoperitoneum was achieved and maintained at 15 mmHg carbon dioxide throughout the entirety of the case.   The 5 mm camera was introduced and the abdomen was inspected and  no underlying trauma was identified from abdominal entry. After infusing local anesthetic under direct visualization two 5 mm right upper quadrant and one 5 mm epigastric port were placed.    The gallbladder was identified in the right upper quadrant.  A combination of blunt and electrocautery dissection were used to dissect the overlaying tissue free from around the cystic duct and cystic artery.  Dissection was continued until the cystic duct and cystic artery free and there is nothing but liver parenchyma behind.  This confirmed the critical view of safety. The cystic duct and cystic artery were double clipped on the patient's side single clipped on the specimen side and sharply transected. The gallbladder was removed from the cystic plate using electrocautery.  I inspected the cystic plate and hemostasis was obtained. The gallbladder was placed in a 10 mm Endo Catch bag through the supraumbilical port and removed.    All ports were removed with video assist, and were hemostatic. The previously placed vicryl suture were tied. All skin sites were closed with subcuticular Monocryl and Dermabond was placed over the wounds.    The patient was awakened from general anesthetic, and was taken to the recovery room in stable condition.    Sponge and needle counts were correct at the end of the case.     Specimen: gallbladder and content for permanent pathology    EBL: 10 mL    Dispo: stable, extubated, to PACU    Danny Keenan M.D.  [facility]  [phone]

## 2023-11-28 NOTE — ANESTHESIA POSTPROCEDURE EVALUATION
Patient: Sridhar Underwood    Procedure Summary       Date: 11/28/23 Room / Location: [facility] 09 / SURGERY [facility]    Anesthesia Start: 1116 Anesthesia Stop: 1213    Procedure: LAPAROSCOPIC CHOLECYSTECTOMY (Abdomen) Diagnosis: (BILIARY DYSKINESIA)    Surgeons: Danny Keenan M.D. Responsible Provider: Froylan Hernandez M.D.    Anesthesia Type: general ASA Status: 2            Final Anesthesia Type: general  Last vitals  BP   Blood Pressure: (!) 162/87    Temp   36.4 °C (97.6 °F)    Pulse   99   Resp   18    SpO2   100 %      Anesthesia Post Evaluation    Patient location during evaluation: PACU  Patient participation: complete - patient participated  Level of consciousness: awake and alert  Pain score: 3    Airway patency: patent  Anesthetic complications: no  Cardiovascular status: hemodynamically stable  Respiratory status: acceptable  Hydration status: euvolemic    PONV: none          There were no known notable events for this encounter.     Nurse Pain Score: 10 (NPRS)

## 2023-11-28 NOTE — OR NURSING
Patient aaox4, vss. From pacu, up to bathroom, voided. Lap sites to abdomen are clean dry and intact. Family at bedside. Plan of care discussed, discharge instructions given to patient and family. To car by wheelchair.

## 2023-11-28 NOTE — ANESTHESIA TIME REPORT
Anesthesia Start and Stop Event Times       Date Time Event    11/28/2023 1035 Ready for Procedure     1116 Anesthesia Start     1213 Anesthesia Stop          Responsible Staff  11/28/23      Name Role Begin End    Froylan Hernandez M.D. Anesth 1116 1213          Overtime Reason:  no overtime (within assigned shift)    Comments:

## 2023-11-28 NOTE — DISCHARGE INSTRUCTIONS
HOME CARE INSTRUCTIONS    ACTIVITY: Rest and take it easy for the first 24 hours.  A responsible adult is recommended to remain with you during that time.  It is normal to feel sleepy.  We encourage you to not do anything that requires balance, judgment or coordination.    FOR 24 HOURS DO NOT:  Drive, operate machinery or run household appliances.  Drink beer or alcoholic beverages.  Make important decisions or sign legal documents.    SPECIAL INSTRUCTIONS:   Laparoscopic Cholecystectomy D/C instructions:    DIET: Upon discharge from the hospital you may resume your normal preoperative diet. Depending on how you are feeling and whether you have nausea or not, you may wish to stay with a bland diet for the first few days. However, you can advance this as quickly as you feel ready.    ACTIVITIES: After discharge from the hospital, you may resume full routine activities. However, there should be no heavy lifting (greater than 15 pounds) and no strenuous activities until after your follow-up visit. Otherwise, routine activities of daily living are acceptable.    DRIVING: You may drive whenever you are off pain medications and are able to perform the activities needed to drive, i.e. turning, bending, twisting, etc.    BATHING: You may get the wound wet at any time after leaving the hospital. You may shower, but do not submerge in a bath for at least a week. Dressings may come off after 48 hours.    BOWEL FUNCTION: A few patients, after this operation, will develop either frequent or loose stools after meals. This usually corrects itself after a few days, to a few weeks. If this occurs, do not worry; it is not unusual and will resolve. Much more common than loose stools, is constipation. The combination of pain medication and decreased activity level can cause constipation in otherwise normal patients. If you feel this is occurring, take a laxative (Milk of Magnesia, Ex-Lax, Senokot, etc.) until the problem has  resolved.    PAIN MEDICATION: You will be given a prescription for pain medication at discharge. Please take these as directed. It is important to remember not to take medications on an empty stomach as this may cause nausea.    CALL IF YOU HAVE: (1) Fevers to more than 1010 F, (2) Unusual chest or leg pain, (3) Drainage or fluid from incision that may be foul smelling, increased tenderness or soreness at the wound or the wound edges are no longer together, redness or swelling at the incision site. Please do not hesitate to call with any other questions.     APPOINTMENT: Contact our office at [phone] for a follow-up appointment in 1 to 2 weeks following your procedure.    If you have any additional questions, please do not hesitate to call the office.    Office address:  [address] [place], [place] [address]e [facility], [address] [place] [address]    DIET: To avoid nausea, slowly advance diet as tolerated, avoiding spicy or greasy foods for the first day.  Add more substantial food to your diet according to your physician's instructions.  Babies can be fed formula or breast milk as soon as they are hungry.  INCREASE FLUIDS AND FIBER TO AVOID CONSTIPATION.    SURGICAL DRESSING/BATHING: You may shower in 24 hours, then you may shower, but no submerging.     MEDICATIONS: Resume taking daily medication.  Take prescribed pain medication with food.  If no medication is prescribed, you may take non-aspirin pain medication if needed.  PAIN MEDICATION CAN BE VERY CONSTIPATING.  Take a stool softener or laxative such as senokot, pericolace, or milk of magnesia if needed.    Prescription given for Oxycodone called to [facility].  Last pain medication given at 12:13 pm..    A follow-up appointment should be arranged with your doctor in 1-2 weeks; call to schedule.    You should CALL YOUR PHYSICIAN if you develop:  Fever greater than 101 degrees F.  Pain not relieved by medication, or persistent nausea or vomiting.  Excessive bleeding (blood soaking through  dressing) or unexpected drainage from the wound.  Extreme redness or swelling around the incision site, drainage of pus or foul smelling drainage.  Inability to urinate or empty your bladder within 8 hours.  Problems with breathing or chest pain.    You should call 911 if you develop problems with breathing or chest pain.  If you are unable to contact your doctor or surgical center, you should go to the nearest emergency room or urgent care center.  Physician's telephone #: [phone]    MILD FLU-LIKE SYMPTOMS ARE NORMAL.  YOU MAY EXPERIENCE GENERALIZED MUSCLE ACHES, THROAT IRRITATION, HEADACHE AND/OR SOME NAUSEA.    If any questions arise, call your doctor.  If your doctor is not available, please feel free to call the Surgical Center at [phone].  The Center is open Monday through Friday from 7AM to 7PM.      A registered nurse may call you a few days after your surgery to see how you are doing after your procedure.    You may also receive a survey in the mail within the next two weeks and we ask that you take a few moments to complete the survey and return it to us.  Our goal is to provide you with very good care and we value your comments.     Depression / Suicide Risk    As you are discharged from this [facility] facility, it is important to learn how to keep safe from harming yourself.    Recognize the warning signs:  Abrupt changes in personality, positive or negative- including increase in energy   Giving away possessions  Change in eating patterns- significant weight changes-  positive or negative  Change in sleeping patterns- unable to sleep or sleeping all the time   Unwillingness or inability to communicate  Depression  Unusual sadness, discouragement and loneliness  Talk of wanting to die  Neglect of personal appearance   Rebelliousness- reckless behavior  Withdrawal from people/activities they love  Confusion- inability to concentrate     If you or a loved one observes any of these behaviors  or has concerns about self-harm, here's what you can do:  Talk about it- your feelings and reasons for harming yourself  Remove any means that you might use to hurt yourself (examples: pills, rope, extension cords, firearm)  Get professional help from the community (Mental Health, Substance Abuse, psychological counseling)  Do not be alone:Call your Safe Contact- someone whom you trust who will be there for you.  Call your local CRISIS HOTLINE 707-9417 or 694-114-4042  Call your local Children's Mobile Crisis Response Team [place] (090) 877-8394 or www[employer]  Call the toll free National Suicide Prevention Hotlines   National Suicide Prevention Lifeline 802-035-NKOL (7603)  [place] [employer] Line Network 800-SUICIDE (780-5878)    I acknowledge receipt and understanding of these Home Care instructions.  Laparoscopic Cholecystectomy D/C instructions:    DIET: Upon discharge from the hospital you may resume your normal preoperative diet. Depending on how you are feeling and whether you have nausea or not, you may wish to stay with a bland diet for the first few days. However, you can advance this as quickly as you feel ready.    ACTIVITIES: After discharge from the hospital, you may resume full routine activities. However, there should be no heavy lifting (greater than 15 pounds) and no strenuous activities until after your follow-up visit. Otherwise, routine activities of daily living are acceptable.    DRIVING: You may drive whenever you are off pain medications and are able to perform the activities needed to drive, i.e. turning, bending, twisting, etc.    BATHING: You may get the wound wet at any time after leaving the hospital. You may shower, but do not submerge in a bath for at least a week. Dressings may come off after 48 hours.    BOWEL FUNCTION: A few patients, after this operation, will develop either frequent or loose stools after meals. This usually corrects itself after a few days, to a few  weeks. If this occurs, do not worry; it is not unusual and will resolve. Much more common than loose stools, is constipation. The combination of pain medication and decreased activity level can cause constipation in otherwise normal patients. If you feel this is occurring, take a laxative (Milk of Magnesia, Ex-Lax, Senokot, etc.) until the problem has resolved.    PAIN MEDICATION: You will be given a prescription for pain medication at discharge. Please take these as directed. It is important to remember not to take medications on an empty stomach as this may cause nausea.    CALL IF YOU HAVE: (1) Fevers to more than 1010 F, (2) Unusual chest or leg pain, (3) Drainage or fluid from incision that may be foul smelling, increased tenderness or soreness at the wound or the wound edges are no longer together, redness or swelling at the incision site. Please do not hesitate to call with any other questions.     APPOINTMENT: Contact our office at [phone] for a follow-up appointment in 1 to 2 weeks following your procedure.    If you have any additional questions, please do not hesitate to call the office.    Office address:   [address], [address] [place] [address]

## 2023-11-28 NOTE — ANESTHESIA PROCEDURE NOTES
Airway    Date/Time: 11/28/2023 11:21 AM    Performed by: Froylan Hernandez M.D.  Authorized by: Froylan Hernandez M.D.    Location:  OR  Urgency:  Elective  Indications for Airway Management:  Anesthesia      Spontaneous Ventilation: absent    Sedation Level:  Deep  Preoxygenated: Yes    Patient Position:  Sniffing  Mask Difficulty Assessment:  1 - vent by mask  Final Airway Type:  Endotracheal airway  Final Endotracheal Airway:  ETT  Cuffed: Yes    Technique Used for Successful ETT Placement:  Direct laryngoscopy    Insertion Site:  Oral  Blade Type:  Wendy  Laryngoscope Blade/Videolaryngoscope Blade Size:  4  ETT Size (mm):  7.5  Measured from:  Lips  ETT to Lips (cm):  23  Placement Verified by: auscultation and capnometry    Cormack-Lehane Classification:  Grade I - full view of glottis  Number of Attempts at Approach:  1

## 2023-11-28 NOTE — ANESTHESIA PREPROCEDURE EVALUATION
" Case: [id number] Date/Time: 11/28/23 1115    Procedure: LAPAROSCOPIC CHOLECYSTECTOMY    Pre-op diagnosis: BILIARY DYSKINESIA    Location: [facility] OR 09 / SURGERY [facility]    Surgeons: Danny Keenan M.D.          39 yo male    P Med Hx:  GERD (gastroesophageal reflux disease)  Kidney stone  Heart burn    P Surg Hx:  Appy  Rotator cuff repair  Clavicle  PONV (postoperative nausea and vomiting)    Quit smoking 2020 (5 pk years)  Previous EtOH abuse.  \"Sober 5 years\"    NPO  Relevant Problems   No relevant active problems       Physical Exam    Airway   Mallampati: II  TM distance: >3 FB  Neck ROM: full       Cardiovascular - normal exam  Rhythm: regular  Rate: normal  (-) murmur     Dental - normal exam           Pulmonary - normal exam  Breath sounds clear to auscultation     Abdominal    Neurological - normal exam                   Anesthesia Plan    ASA 2       Plan - general       Airway plan will be ETT          Induction: intravenous    Postoperative Plan: Postoperative administration of opioids is intended.    Pertinent diagnostic labs and testing reviewed    Informed Consent:    Anesthetic plan and risks discussed with patient.    Use of blood products discussed with: patient whom consented to blood products.           "

## 2023-11-28 NOTE — OR NURSING
Awake, alert and tolerating PO fluids.  Medicated for pain/nausea - both now controlled.  Abd lap sites x4 clean, dry and well approximated with ice pack to abdomen.  Incentive spirometry teaching done with return demonstration.  Vitals stable.  On monitors with alarms audible.    Called family member with update and approximate DC timeline.

## 2023-12-05 ENCOUNTER — APPOINTMENT (OUTPATIENT)
Dept: RADIOLOGY | Facility: MEDICAL CENTER | Age: 38
End: 2023-12-05
Attending: EMERGENCY MEDICINE
Payer: COMMERCIAL

## 2023-12-05 ENCOUNTER — HOSPITAL ENCOUNTER (EMERGENCY)
Facility: MEDICAL CENTER | Age: 38
End: 2023-12-05
Attending: EMERGENCY MEDICINE | Admitting: STUDENT IN AN ORGANIZED HEALTH CARE EDUCATION/TRAINING PROGRAM
Payer: COMMERCIAL

## 2023-12-05 ENCOUNTER — APPOINTMENT (OUTPATIENT)
Dept: CARDIOLOGY | Facility: MEDICAL CENTER | Age: 38
End: 2023-12-05
Attending: EMERGENCY MEDICINE
Payer: COMMERCIAL

## 2023-12-05 VITALS
BODY MASS INDEX: 38.35 KG/M2 | DIASTOLIC BLOOD PRESSURE: 92 MMHG | HEIGHT: 65 IN | SYSTOLIC BLOOD PRESSURE: 146 MMHG | OXYGEN SATURATION: 93 % | RESPIRATION RATE: 16 BRPM | HEART RATE: 91 BPM | TEMPERATURE: 97.8 F | WEIGHT: 230.2 LBS

## 2023-12-05 DIAGNOSIS — R10.9 ABDOMINAL PAIN, UNSPECIFIED ABDOMINAL LOCATION: ICD-10-CM

## 2023-12-05 DIAGNOSIS — R07.9 CHEST PAIN, UNSPECIFIED TYPE: ICD-10-CM

## 2023-12-05 DIAGNOSIS — K29.60 REFLUX GASTRITIS: ICD-10-CM

## 2023-12-05 DIAGNOSIS — K20.90 ESOPHAGITIS: ICD-10-CM

## 2023-12-05 PROBLEM — Z87.19 HISTORY OF MALLORY-WEISS SYNDROME: Status: ACTIVE | Noted: 2023-12-05

## 2023-12-05 PROBLEM — F40.298: Status: ACTIVE | Noted: 2023-12-05

## 2023-12-05 PROBLEM — R07.89 OTHER CHEST PAIN: Status: ACTIVE | Noted: 2023-12-05

## 2023-12-05 PROBLEM — K75.81 STEATOHEPATITIS: Status: ACTIVE | Noted: 2023-12-05

## 2023-12-05 PROBLEM — K21.00 GASTROESOPHAGEAL REFLUX DISEASE WITH ESOPHAGITIS WITHOUT HEMORRHAGE: Status: ACTIVE | Noted: 2023-12-05

## 2023-12-05 LAB
ALBUMIN SERPL BCP-MCNC: 4.2 G/DL (ref 3.2–4.9)
ALBUMIN/GLOB SERPL: 1.3 G/DL
ALP SERPL-CCNC: 75 U/L (ref 30–99)
ALT SERPL-CCNC: 83 U/L (ref 2–50)
AMPHET UR QL SCN: NEGATIVE
ANION GAP SERPL CALC-SCNC: 14 MMOL/L (ref 7–16)
AST SERPL-CCNC: 41 U/L (ref 12–45)
BARBITURATES UR QL SCN: NEGATIVE
BASOPHILS # BLD AUTO: 0.7 % (ref 0–1.8)
BASOPHILS # BLD: 0.08 K/UL (ref 0–0.12)
BENZODIAZ UR QL SCN: POSITIVE
BILIRUB SERPL-MCNC: 0.5 MG/DL (ref 0.1–1.5)
BUN SERPL-MCNC: 12 MG/DL (ref 8–22)
BZE UR QL SCN: NEGATIVE
CALCIUM ALBUM COR SERPL-MCNC: 9.2 MG/DL (ref 8.5–10.5)
CALCIUM SERPL-MCNC: 9.4 MG/DL (ref 8.5–10.5)
CANNABINOIDS UR QL SCN: POSITIVE
CHLORIDE SERPL-SCNC: 100 MMOL/L (ref 96–112)
CO2 SERPL-SCNC: 22 MMOL/L (ref 20–33)
CREAT SERPL-MCNC: 0.72 MG/DL (ref 0.5–1.4)
EKG IMPRESSION: NORMAL
EOSINOPHIL # BLD AUTO: 0.22 K/UL (ref 0–0.51)
EOSINOPHIL NFR BLD: 2 % (ref 0–6.9)
ERYTHROCYTE [DISTWIDTH] IN BLOOD BY AUTOMATED COUNT: 42.9 FL (ref 35.9–50)
ERYTHROCYTE [SEDIMENTATION RATE] IN BLOOD BY WESTERGREN METHOD: 4 MM/HOUR (ref 0–20)
ETHANOL BLD-MCNC: <10.1 MG/DL
FENTANYL UR QL: NEGATIVE
GFR SERPLBLD CREATININE-BSD FMLA CKD-EPI: 120 ML/MIN/1.73 M 2
GLOBULIN SER CALC-MCNC: 3.2 G/DL (ref 1.9–3.5)
GLUCOSE SERPL-MCNC: 112 MG/DL (ref 65–99)
HCT VFR BLD AUTO: 45.7 % (ref 42–52)
HGB BLD-MCNC: 15.8 G/DL (ref 14–18)
IMM GRANULOCYTES # BLD AUTO: 0.2 K/UL (ref 0–0.11)
IMM GRANULOCYTES NFR BLD AUTO: 1.9 % (ref 0–0.9)
LIPASE SERPL-CCNC: 20 U/L (ref 11–82)
LV EJECT FRACT  99904: 54
LYMPHOCYTES # BLD AUTO: 2.57 K/UL (ref 1–4.8)
LYMPHOCYTES NFR BLD: 23.9 % (ref 22–41)
MAGNESIUM SERPL-MCNC: 1.9 MG/DL (ref 1.5–2.5)
MCH RBC QN AUTO: 30.6 PG (ref 27–33)
MCHC RBC AUTO-ENTMCNC: 34.6 G/DL (ref 32.3–36.5)
MCV RBC AUTO: 88.4 FL (ref 81.4–97.8)
METHADONE UR QL SCN: NEGATIVE
MONOCYTES # BLD AUTO: 0.85 K/UL (ref 0–0.85)
MONOCYTES NFR BLD AUTO: 7.9 % (ref 0–13.4)
NEUTROPHILS # BLD AUTO: 6.82 K/UL (ref 1.82–7.42)
NEUTROPHILS NFR BLD: 63.6 % (ref 44–72)
NRBC # BLD AUTO: 0 K/UL
NRBC BLD-RTO: 0 /100 WBC (ref 0–0.2)
NT-PROBNP SERPL IA-MCNC: <36 PG/ML (ref 0–125)
OPIATES UR QL SCN: NEGATIVE
OXYCODONE UR QL SCN: NEGATIVE
PCP UR QL SCN: NEGATIVE
PHOSPHATE SERPL-MCNC: 1.9 MG/DL (ref 2.5–4.5)
PLATELET # BLD AUTO: 241 K/UL (ref 164–446)
PMV BLD AUTO: 10.2 FL (ref 9–12.9)
POTASSIUM SERPL-SCNC: 4.1 MMOL/L (ref 3.6–5.5)
PROCALCITONIN SERPL-MCNC: 0.09 NG/ML
PROPOXYPH UR QL SCN: NEGATIVE
PROT SERPL-MCNC: 7.4 G/DL (ref 6–8.2)
RBC # BLD AUTO: 5.17 M/UL (ref 4.7–6.1)
SODIUM SERPL-SCNC: 136 MMOL/L (ref 135–145)
TROPONIN T SERPL-MCNC: <6 NG/L (ref 6–19)
TROPONIN T SERPL-MCNC: <6 NG/L (ref 6–19)
WBC # BLD AUTO: 10.7 K/UL (ref 4.8–10.8)

## 2023-12-05 PROCEDURE — 700117 HCHG RX CONTRAST REV CODE 255: Performed by: EMERGENCY MEDICINE

## 2023-12-05 PROCEDURE — 85652 RBC SED RATE AUTOMATED: CPT

## 2023-12-05 PROCEDURE — 96374 THER/PROPH/DIAG INJ IV PUSH: CPT

## 2023-12-05 PROCEDURE — 80053 COMPREHEN METABOLIC PANEL: CPT

## 2023-12-05 PROCEDURE — 84145 PROCALCITONIN (PCT): CPT

## 2023-12-05 PROCEDURE — 83690 ASSAY OF LIPASE: CPT

## 2023-12-05 PROCEDURE — 83880 ASSAY OF NATRIURETIC PEPTIDE: CPT

## 2023-12-05 PROCEDURE — 96375 TX/PRO/DX INJ NEW DRUG ADDON: CPT

## 2023-12-05 PROCEDURE — 82077 ASSAY SPEC XCP UR&BREATH IA: CPT

## 2023-12-05 PROCEDURE — 80307 DRUG TEST PRSMV CHEM ANLYZR: CPT

## 2023-12-05 PROCEDURE — 83735 ASSAY OF MAGNESIUM: CPT

## 2023-12-05 PROCEDURE — 700102 HCHG RX REV CODE 250 W/ 637 OVERRIDE(OP): Performed by: EMERGENCY MEDICINE

## 2023-12-05 PROCEDURE — 84100 ASSAY OF PHOSPHORUS: CPT

## 2023-12-05 PROCEDURE — 93306 TTE W/DOPPLER COMPLETE: CPT | Mod: 26 | Performed by: INTERNAL MEDICINE

## 2023-12-05 PROCEDURE — 99285 EMERGENCY DEPT VISIT HI MDM: CPT

## 2023-12-05 PROCEDURE — 93306 TTE W/DOPPLER COMPLETE: CPT

## 2023-12-05 PROCEDURE — A9270 NON-COVERED ITEM OR SERVICE: HCPCS | Performed by: EMERGENCY MEDICINE

## 2023-12-05 PROCEDURE — 93005 ELECTROCARDIOGRAM TRACING: CPT | Performed by: EMERGENCY MEDICINE

## 2023-12-05 PROCEDURE — 71045 X-RAY EXAM CHEST 1 VIEW: CPT

## 2023-12-05 PROCEDURE — 85025 COMPLETE CBC W/AUTO DIFF WBC: CPT

## 2023-12-05 PROCEDURE — 36415 COLL VENOUS BLD VENIPUNCTURE: CPT

## 2023-12-05 PROCEDURE — 71275 CT ANGIOGRAPHY CHEST: CPT

## 2023-12-05 PROCEDURE — 99284 EMERGENCY DEPT VISIT MOD MDM: CPT | Performed by: INTERNAL MEDICINE

## 2023-12-05 PROCEDURE — 700111 HCHG RX REV CODE 636 W/ 250 OVERRIDE (IP): Mod: JZ | Performed by: EMERGENCY MEDICINE

## 2023-12-05 PROCEDURE — 74177 CT ABD & PELVIS W/CONTRAST: CPT

## 2023-12-05 PROCEDURE — 84484 ASSAY OF TROPONIN QUANT: CPT | Mod: 91

## 2023-12-05 PROCEDURE — 99285 EMERGENCY DEPT VISIT HI MDM: CPT | Performed by: STUDENT IN AN ORGANIZED HEALTH CARE EDUCATION/TRAINING PROGRAM

## 2023-12-05 RX ORDER — FAMOTIDINE 20 MG/1
20 TABLET, FILM COATED ORAL 2 TIMES DAILY PRN
Qty: 60 TABLET | Refills: 0 | Status: SHIPPED | OUTPATIENT
Start: 2023-12-05

## 2023-12-05 RX ORDER — DIAZEPAM 5 MG/ML
5 INJECTION, SOLUTION INTRAMUSCULAR; INTRAVENOUS ONCE
Status: DISCONTINUED | OUTPATIENT
Start: 2023-12-05 | End: 2023-12-05

## 2023-12-05 RX ORDER — LIDOCAINE HYDROCHLORIDE 20 MG/ML
15 SOLUTION OROPHARYNGEAL EVERY 6 HOURS PRN
Qty: 300 ML | Refills: 1 | Status: SHIPPED | OUTPATIENT
Start: 2023-12-05

## 2023-12-05 RX ORDER — ALUMINA, MAGNESIA, AND SIMETHICONE 2400; 2400; 240 MG/30ML; MG/30ML; MG/30ML
15 SUSPENSION ORAL EVERY 6 HOURS PRN
Qty: 355 ML | Refills: 0 | COMMUNITY
Start: 2023-12-05

## 2023-12-05 RX ORDER — SUCRALFATE 1 G/1
1 TABLET ORAL
Qty: 120 TABLET | Refills: 3 | Status: SHIPPED | OUTPATIENT
Start: 2023-12-05 | End: 2023-12-05

## 2023-12-05 RX ORDER — ONDANSETRON 4 MG/1
4 TABLET, FILM COATED ORAL EVERY 4 HOURS PRN
Qty: 30 EACH | Refills: 1 | Status: SHIPPED | OUTPATIENT
Start: 2023-12-05

## 2023-12-05 RX ORDER — ACETAMINOPHEN 500 MG
1000 TABLET ORAL EVERY 6 HOURS PRN
COMMUNITY

## 2023-12-05 RX ORDER — ONDANSETRON 2 MG/ML
4 INJECTION INTRAMUSCULAR; INTRAVENOUS ONCE
Status: DISCONTINUED | OUTPATIENT
Start: 2023-12-05 | End: 2023-12-05 | Stop reason: HOSPADM

## 2023-12-05 RX ORDER — MORPHINE SULFATE 4 MG/ML
4 INJECTION INTRAVENOUS ONCE
Status: DISCONTINUED | OUTPATIENT
Start: 2023-12-05 | End: 2023-12-05 | Stop reason: HOSPADM

## 2023-12-05 RX ORDER — DIAZEPAM 5 MG/ML
2.5 INJECTION, SOLUTION INTRAMUSCULAR; INTRAVENOUS ONCE
Status: COMPLETED | OUTPATIENT
Start: 2023-12-05 | End: 2023-12-05

## 2023-12-05 RX ORDER — MIDAZOLAM HYDROCHLORIDE 1 MG/ML
5 INJECTION INTRAMUSCULAR; INTRAVENOUS ONCE
Status: COMPLETED | OUTPATIENT
Start: 2023-12-05 | End: 2023-12-05

## 2023-12-05 RX ORDER — PANTOPRAZOLE SODIUM 20 MG/1
20 TABLET, DELAYED RELEASE ORAL 2 TIMES DAILY
Qty: 60 TABLET | Refills: 1 | Status: SHIPPED | OUTPATIENT
Start: 2023-12-05

## 2023-12-05 RX ORDER — FAMOTIDINE 20 MG/1
20 TABLET, FILM COATED ORAL 2 TIMES DAILY
Qty: 60 TABLET | Refills: 0 | Status: SHIPPED | OUTPATIENT
Start: 2023-12-05 | End: 2023-12-05 | Stop reason: SDUPTHER

## 2023-12-05 RX ORDER — PROCHLORPERAZINE MALEATE 5 MG/1
5 TABLET ORAL EVERY 6 HOURS PRN
Qty: 30 TABLET | Refills: 1 | Status: SHIPPED | OUTPATIENT
Start: 2023-12-05

## 2023-12-05 RX ORDER — FAMOTIDINE 20 MG/1
20 TABLET, FILM COATED ORAL ONCE
Status: COMPLETED | OUTPATIENT
Start: 2023-12-05 | End: 2023-12-05

## 2023-12-05 RX ADMIN — IOHEXOL 100 ML: 350 INJECTION, SOLUTION INTRAVENOUS at 11:32

## 2023-12-05 RX ADMIN — MIDAZOLAM HYDROCHLORIDE 5 MG: 1 INJECTION, SOLUTION INTRAMUSCULAR; INTRAVENOUS at 14:29

## 2023-12-05 RX ADMIN — DIAZEPAM 2.5 MG: 10 INJECTION, SOLUTION INTRAMUSCULAR; INTRAVENOUS at 13:14

## 2023-12-05 RX ADMIN — FAMOTIDINE 20 MG: 20 TABLET ORAL at 13:14

## 2023-12-05 RX ADMIN — LIDOCAINE HYDROCHLORIDE 30 ML: 20 SOLUTION ORAL; TOPICAL at 13:14

## 2023-12-05 ASSESSMENT — ENCOUNTER SYMPTOMS
NERVOUS/ANXIOUS: 1
WEAKNESS: 0
BLOOD IN STOOL: 0
COUGH: 0
EYE PAIN: 0
DIARRHEA: 0
VOMITING: 1
HEADACHES: 0
PALPITATIONS: 0
CONSTIPATION: 0
HEMOPTYSIS: 0
DEPRESSION: 0
BRUISES/BLEEDS EASILY: 0
SHORTNESS OF BREATH: 0
NAUSEA: 1
SORE THROAT: 0
MYALGIAS: 0
BACK PAIN: 0
CHILLS: 0
ABDOMINAL PAIN: 1
FLANK PAIN: 0
NECK PAIN: 0
SINUS PAIN: 0
FEVER: 0

## 2023-12-05 ASSESSMENT — FIBROSIS 4 INDEX: FIB4 SCORE: 0.65

## 2023-12-05 NOTE — ED NOTES
"Med Rec complete per patient   Allergies reviewed  Antibiotics in the past 30 days:no  Anticoagulant in past 14 days:no  Pharmacy patient utilizes:[facility] on [address]    Pt states for the past week he was told to take 2 tabs of Pantoprazole BID (40 mg) but then states a couple days ago he felt something \"overflow/burst\" in his stomach and held off taking it for past 2 days.    "

## 2023-12-05 NOTE — ED PROVIDER NOTES
ED Provider Note    CHIEF COMPLAINT  Chief Complaint   Patient presents with    Chest Pain    Abdominal Pain       HPI/ROS    OUTSIDE HISTORIAN(S):  EMS stating the patient has any chest pain or abdominal pain.  His EKG did show possible ST elevation myocardial infarction laterally received nitroglycerin as well as Versed for anxiety.    Sridhar Underwood is a 38 y.o. male who presents with complaint of chest pain and abdominal pain.  The patient had a cholecystectomy on 11/28/2023 by Dr. Keenan and symptoms of abdominal pain.  He woke this morning with severe chest pain in the center of his chest as well as abdominal pain.  He states it feels like the pleurisy he has had before in the past.  Denies fever, shakes, chills, sweats, nausea, vomiting, lightheadedness, dizziness, hematemesis, recent trauma.  EMS was notified, they can the patient's bedside and found that he possibly had ST elevation myocardial infarction the lateral leads with ST elevation.  This is new from previous EKGs therefore if they activated a STEMI.  The patient did not receive aspirin secondary to his recent surgery yet received nitroglycerin and Versed secondary to patient's anxiety.  The patient states he is extremely anxious about his condition especially his abdominal condition and the pain he is having his abdomen.    Cardiac Risk Factors:  No Age > 55  No Aspirin use within 7 days  No prior history of coronary artery disease  No diabetes  No hyperlipidemia/chloesterol   No hypertension  No obesity  No family history of coronary artery disease at a young age <56 yo  Yes smoking tobacco use, patient quit 3 years ago but states he smoked for approximately 20 years  No drugs (methamphetamine or cocaine)  No history of aortic aneurysm   No history of aortic dissection   No history of deep vein thrombosis or pulmonary embolism       PAST MEDICAL HISTORY   has a past medical history of GERD (gastroesophageal reflux disease), Heart burn, Kidney  "stone, PONV (postoperative nausea and vomiting), and Postoperative pain (11/28/2023).    SURGICAL HISTORY   has a past surgical history that includes appendectomy; other orthopedic surgery; other orthopedic surgery; and melanie by laparoscopy (N/A, 11/28/2023).    FAMILY HISTORY  History reviewed. No pertinent family history.    SOCIAL HISTORY  Social History     Tobacco Use    Smoking status: Former     Current packs/day: 0.00     Average packs/day: 0.3 packs/day for 20.0 years (5.0 ttl pk-yrs)     Types: Cigarettes     Quit date: 12/4/2020     Years since quitting: 3.0    Smokeless tobacco: Current     Types: Chew   Vaping Use    Vaping Use: Former   Substance and Sexual Activity    Alcohol use: Not Currently     Comment: occ.    Drug use: Yes     Types: Inhaled     Comment: marijuana    Sexual activity: Not on file       CURRENT MEDICATIONS  Home Medications       Reviewed by Joe Hernandez M.D. (Physician) on 12/05/23 at 1455  Med List Status: Complete     Medication Last Dose Status   acetaminophen (TYLENOL) 500 MG Tab 12/4/2023 Active   ibuprofen (MOTRIN) 600 MG Tab 12/4/2023 Active   ondansetron (ZOFRAN) 8 MG Tab 12/4/2023 Active   pantoprazole (PROTONIX) 20 MG tablet 2 DAYS AGO Active                    ALLERGIES  No Known Allergies    PHYSICAL EXAM  VITAL SIGNS: /78   Pulse 88   Temp 36.6 °C (97.8 °F)   Resp 16   Ht 1.651 m (5' 5\")   Wt 104 kg (230 lb 3.2 oz)   SpO2 95%   BMI 38.31 kg/m²    Nursing notes and vitals reviewed.  Constitutional: Well developed, Well nourished, No acute distress, Non-toxic appearance.   Eyes: PERRLA, EOMI, Conjunctiva normal, No discharge.   HENT: Normocephalic, Atraumatic, moist mucous membranes, no facial edema Cardiovascular: Normal heart rate, Normal rhythm, No murmurs, No rubs, No gallops.   Thorax & Lungs: No respiratory distress, No rales, No rhonchi, No wheezing, No chest wall tenderness.   Abdomen: Diffuse abdominal tenderness, surgical incisions with slight " ecchymosis, no dehiscence, no guarding, no rebound, normal bowel sounds   Skin: Ecchymosis the entire abdominal wall, especially around the incision sites for his previous cholecystectomy  Extremities: No deformity, no pedal edema, good range of motion range of motion upper lower extremes bilaterally  Neurologic: Alert & oriented x 3, no focal abnormalities noted, acting appropriately on examination  Psychiatric: Anxious      DIAGNOSTIC STUDIES / PROCEDURES  EKG  I have independently interpreted this EKG  Sinus rhythm on monitor    LABS  Results for orders placed or performed during the hospital encounter of 12/05/23   EC-ECHOCARDIOGRAM COMPLETE W/O CONT   Result Value Ref Range    Left Ventrical Ejection Fraction 54    CBC w/ Differential   Result Value Ref Range    WBC 10.7 4.8 - 10.8 K/uL    RBC 5.17 4.70 - 6.10 M/uL    Hemoglobin 15.8 14.0 - 18.0 g/dL    Hematocrit 45.7 42.0 - 52.0 %    MCV 88.4 81.4 - 97.8 fL    MCH 30.6 27.0 - 33.0 pg    MCHC 34.6 32.3 - 36.5 g/dL    RDW 42.9 35.9 - 50.0 fL    Platelet Count 241 164 - 446 K/uL    MPV 10.2 9.0 - 12.9 fL    Neutrophils-Polys 63.60 44.00 - 72.00 %    Lymphocytes 23.90 22.00 - 41.00 %    Monocytes 7.90 0.00 - 13.40 %    Eosinophils 2.00 0.00 - 6.90 %    Basophils 0.70 0.00 - 1.80 %    Immature Granulocytes 1.90 (H) 0.00 - 0.90 %    Nucleated RBC 0.00 0.00 - 0.20 /100 WBC    Neutrophils (Absolute) 6.82 1.82 - 7.42 K/uL    Lymphs (Absolute) 2.57 1.00 - 4.80 K/uL    Monos (Absolute) 0.85 0.00 - 0.85 K/uL    Eos (Absolute) 0.22 0.00 - 0.51 K/uL    Baso (Absolute) 0.08 0.00 - 0.12 K/uL    Immature Granulocytes (abs) 0.20 (H) 0.00 - 0.11 K/uL    NRBC (Absolute) 0.00 K/uL   Complete Metabolic Panel (CMP)   Result Value Ref Range    Sodium 136 135 - 145 mmol/L    Potassium 4.1 3.6 - 5.5 mmol/L    Chloride 100 96 - 112 mmol/L    Co2 22 20 - 33 mmol/L    Anion Gap 14.0 7.0 - 16.0    Glucose 112 (H) 65 - 99 mg/dL    Bun 12 8 - 22 mg/dL    Creatinine 0.72 0.50 - 1.40 mg/dL     Calcium 9.4 8.5 - 10.5 mg/dL    Correct Calcium 9.2 8.5 - 10.5 mg/dL    AST(SGOT) 41 12 - 45 U/L    ALT(SGPT) 83 (H) 2 - 50 U/L    Alkaline Phosphatase 75 30 - 99 U/L    Total Bilirubin 0.5 0.1 - 1.5 mg/dL    Albumin 4.2 3.2 - 4.9 g/dL    Total Protein 7.4 6.0 - 8.2 g/dL    Globulin 3.2 1.9 - 3.5 g/dL    A-G Ratio 1.3 g/dL   proBrain Natriuretic Peptide, NT   Result Value Ref Range    NT-proBNP <36 0 - 125 pg/mL   Troponin - STAT Once   Result Value Ref Range    Troponin T <6 6 - 19 ng/L   Lipase   Result Value Ref Range    Lipase 20 11 - 82 U/L   Magnesium   Result Value Ref Range    Magnesium 1.9 1.5 - 2.5 mg/dL   Phosphorus   Result Value Ref Range    Phosphorus 1.9 (L) 2.5 - 4.5 mg/dL   Sed Rate   Result Value Ref Range    Sed Rate Westergren 4 0 - 20 mm/hour   PROCALCITONIN   Result Value Ref Range    Procalcitonin 0.09 <0.25 ng/mL   URINE DRUG SCREEN   Result Value Ref Range    Amphetamines Urine Negative Negative    Barbiturates Negative Negative    Benzodiazepines Positive (A) Negative    Cocaine Metabolite Negative Negative    Fentanyl, Urine Negative Negative    Methadone Negative Negative    Opiates Negative Negative    Oxycodone Negative Negative    Phencyclidine -Pcp Negative Negative    Propoxyphene Negative Negative    Cannabinoid Metab Positive (A) Negative   DIAGNOSTIC ALCOHOL   Result Value Ref Range    Diagnostic Alcohol <10.1 <10.1 mg/dL   ESTIMATED GFR   Result Value Ref Range    GFR (CKD-EPI) 120 >60 mL/min/1.73 m 2   TROPONIN   Result Value Ref Range    Troponin T <6 6 - 19 ng/L   EKG   Result Value Ref Range    Report       [facility] Emergency Dept.    Test Date:  2023  Pt Name:    DOC BOWLES                 Department: ER  MRN:        [id number]                      Room:       RD 07  Gender:     Male                         Technician: [id number]  :        1985                   Requested By:KEL MOBLEY  Order #:    [id number]                    Reading  MD: KEL MOBLEY DO    Measurements  Intervals                                Axis  Rate:       77                           P:          10  NV:         167                          QRS:        12  QRSD:       82                           T:          33  QT:         368  QTc:        417    Interpretive Statements  Sinus rhythm  ST elev, probable normal early repol pattern  Baseline wander in lead(s) I,aVR,aVL,V4  Compared to ECG 10/31/2023 00:05:34  No significant changes  Electronically Signed On 12- 13:54:17 PST by KEL MOBLEY DO           RADIOLOGY  I have independently interpreted the diagnostic imaging associated with this visit and am waiting the final reading from the radiologist.   My preliminary interpretation is as follows: Chest x-ray is negative for infiltrate  Radiologist interpretation:   CT-ABDOMEN-PELVIS WITH   Final Result      1.  No acute abnormality of the abdomen or pelvis.   2.  Hepatomegaly.   3.  Hepatic steatosis.   4.  Status post cholecystectomy.   5.  Stable 3 mm right middle lobe pulmonary nodule.      Fleischner Society pulmonary nodule recommendations:   Low Risk: No routine follow-up      High Risk: Optional CT at 12 months      Comments: Nodules less than 6 mm do not require routine follow-up, but certain patients at high risk with suspicious nodule morphology, upper lobe location, or both may warrant 12-month follow-up.      Low Risk - Minimal or absent history of smoking and of other known risk factors.      High Risk - History of smoking or of other known risk factors.      Note: These recommendations do not apply to lung cancer screening, patients with immunosuppression, or patients with known primary cancer.      Fleischner Society 2017 Guidelines for Management of Incidentally Detected Pulmonary Nodules in Adults            CT-CTA CHEST PULMONARY ARTERY W/ RECONS   Final Result      1.  No evidence of pulmonary embolism.   2.  Hepatic steatosis.             DX-CHEST-PORTABLE (1 VIEW)   Final Result      No acute cardiac or pulmonary abnormalities are identified.      EC-ECHOCARDIOGRAM COMPLETE W/O CONT   Final Result            COURSE & MEDICAL DECISION MAKING    ED Observation Status? Yes; I am placing the patient in to an observation status due to a diagnostic uncertainty as well as therapeutic intensity. Patient placed in observation status at 9:19 AM, 12/5/2023.     Observation plan is as follows: Delta troponins, imaging of the chest and abdomen    Upon Reevaluation, the patient's condition has: Improved; and will be discharged.    Patient discharged from ED Observation status at 1525 (Time) 12/5/2023 (Date).     INITIAL ASSESSMENT, COURSE AND PLAN  Care Narrative: This is a pleasant 38-year-old gentleman presents with chest pain and abdominal pain.  Initial EKG with EMS was suspicious for ST elevation myocardial infarction.  Here in the emergency department, EKG shows no ST elevation myocardial infarction, does not have criteria for activation of Cath Lab.  Dr. Rivas is at bedside who states at this point does not believe the patient is having a myocardial infarction does not need to activate Cath Lab for PCI.  He asked for a stat echocardiogram.  This was completed that revealed no evidence of cardiac abnormality, no left ventricular dysfunction.  As for abdominal pain, the patient recently had surgery and concern for possible postop complication such as intra-abdominal abscess, seroma, bile leak therefore CT of the abdomen pelvis were completed.  In addition, he is at high risk of pulmonary bleeding with his previous surgery therefore CT pulmonary angiogram has been ordered.  CT pulm angiogram is negative for acute abnormality such as pulmonary embolism, pericardial effusion, pneumonia.  In addition, CT abdomen pelvis are negative for acute abnormality such as abscess, hematoma, seroma or significant abnormality.    Reevaluation of the patient at 12:40  PM, the patient continues to have pain, he is anxious, he is upset renal and why he is having such severe pain and.  For this reason, patient received GI cocktail and Pepcid.  Second troponin came back negative for ST elevation myocardial infarction and Dr. Wu back has evaluated the patient's overall chart does not believe the patient requires PCI and is not cardiac in nature.    1500, the patient chemo-induced agitated stating he is getting caught in the ambulance for him to come back to the hospital at different hospital as it is not why of such pain.  He has no emergency medical condition that is catastrophic or life-threatening currently.  The patient has no evidence of myocardial infarction, pulmonary embolism, intra-abdominal pathology or trauma.  Given the patient Versed for his anxiety as well as Toradol IV.  Discussed the patient with Dr. Butler who will be evaluated the patient and states he will be doing a consult and probably discharge plan for this patient in consult but ultimately the patient will probably be discharged secondary to no organic etiology of the patient's condition.      CRITICAL CARE  The very real possibilty of a deterioration of this patient's condition required the highest level of my preparedness for sudden, emergent intervention.  I provided critical care services, which included medication orders, frequent reevaluations of the patient's condition and response to treatment, ordering and reviewing test results, and discussing the case with various consultants.  The critical care time associated with the care of the patient was 35 minutes. Review chart for interventions. This time is exclusive of any other billable procedures.          ADDITIONAL PROBLEM LIST  Anxiety probably contributing to his presentation today.  DISPOSITION AND DISCUSSIONS  I have discussed management of the patient with the following physicians and LIBERTAD's: Dr. Rivas with cardiology is at bedside and patient  presentation.  He states he would like a stat echocardiogram, does not believe the patient has an ST elevation myocardial infarction not activate the Cath Lab and if echocardiogram does not show significant abnormality per the patient have a workup for pulmonary embolism and other etiology of the patient's pain is noncardiac.      Escalation of care considered, and ultimately not performed:the patient was evaluated by Dr. Butler, after discussion I have recommended the patient to be discharged        FINAL DIAGNOSIS  1. Chest pain, unspecified type Active   2. Abdominal pain, unspecified abdominal location Active   3. Reflux gastritis      DISPOSITION:  Patient will be discharged home in stable condition.    FOLLOW UP:  [facility], Emergency Dept  [address] [id number] [phone]    If symptoms worsen    Mohsen Tamasaby, M.D.  [address]  [place] [id number] [phone]    Schedule an appointment as soon as possible for a visit   As needed    Danny Keenan M.D.  [address]  [place] [id number] [phone]    Schedule an appointment as soon as possible for a visit   As needed      OUTPATIENT MEDICATIONS:  New Prescriptions    FAMOTIDINE (PEPCID) 20 MG TAB    Take 1 Tablet by mouth 2 times a day.    SUCRALFATE (CARAFATE) 1 GM TAB    Take 1 Tablet by mouth 4 Times a Day,Before Meals and at Bedtime.         Electronically signed by: Derek Song D.O., 12/5/2023 8:52 AM
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
## 2023-12-05 NOTE — ED NOTES
Pt is agitated and out of bed, Pt removed all monitoring equipment, Pt states I am not going to be admitted I am going home. ERP called back to bedside.

## 2023-12-05 NOTE — PROGRESS NOTES
CARDIOLOGY ADDENDUM:  Normal transthoracic echocardiogram without abnormalities.  This does not represent an acute coronary syndrome.  ECG does not support a diagnosis of pericarditis as it is unchanged over multiple visits and likely represents early repolarization abnormality relating to his young age.  Recommend symptomatic management and evaluation of noncardiac etiologies for symptom complex.  Should new information comes to light or there are further questions for cardiology please do not hesitate to reach back out.    Rashawn Hickman M.D.

## 2023-12-05 NOTE — CONSULTS
CARDIOLOGY CONSULTATION NOTE      Date of Consultation: 12/5/2023  Consulting Provider: Derek Song D.o.    Patient Name: Sridhar Underwood  YOB: 1985  MRN: [id number]    Reason for Consultation:   Possible STEMI    Assessment/Recommendations:   The patient's ECG demonstrates fairly diffuse, although somewhat anterolateral localized, sub-millimeter ST elevations not quite meeting criteria for STEMI.  Additionally, his ECG from today is not significantly changed from his prior ECGs.  He describes relatively diffuse pain across his chest, abdomen, and upper back that started suddenly, but does not appear in severe distress or diaphoretic.  His pain is palpable across his upper chest and worsened with deep breathing.  As his symptoms are not clearly anginal and his ECG is not clearly a STEMI, recommend a stat echocardiogram to evaluate for focal wall motion abnormalities that would be concerning for acute injury as well as a pericardial effusion as his ECG could be consistent with pericarditis.  If his echocardiogram shows concerning wall motion abnormalities, then we will bring emergently to the cardiac catheterization laboratory.  If there are not concerning changes on his echocardiogram, would then proceed with a CTPE study given the pleuritic nature of his chest pain as well as recent surgery.    History of Present Illness:   Sridhar Underwood is a 38 year-old gentleman with a past medical history of prior tobacco abuse (approximately 20 pack years, quit 3 years ago), dyslipidemia, GERD, and pleurisy.  Briefly, he underwent cholecystectomy on 11/28/2023.  This morning, he burped and then suddenly experienced severe pain in his abdomen and chest and upper back.  This pain was worsened by deep breathing.  He called EMS due to the severity of his pain and a field ECG demonstrated fairly diffuse, sub-millimeter ST elevations and a STEMI alert was activated.  Upon arrival in the ED, he  continued to have significant diffuse pain and a repeat ECG demonstrated a similar ST elevation pattern.  Cardiology was consulted for further evaluation.    On my review, he reports ongoing upper back, chest, and abdominal pain.  He denies any known history of prior coronary artery disease or myocardial infarction.  He is a prior smoker, but quit approximately 3 years ago.  He does not take any blood pressure or cholesterol medications.  He denies known history of diabetes.    Medical History:     Past Medical History:   Diagnosis Date    GERD (gastroesophageal reflux disease)     Heart burn     Kidney stone     PONV (postoperative nausea and vomiting)     Postoperative pain 11/28/2023     Surgical History:     Past Surgical History:   Procedure Laterality Date    JYOTHI BY LAPAROSCOPY N/A 11/28/2023    Procedure: LAPAROSCOPIC CHOLECYSTECTOMY;  Surgeon: Danny Keenan M.D.;  Location: SURGERY [facility];  Service: General    APPENDECTOMY      OTHER ORTHOPEDIC SURGERY      rotator  right    OTHER ORTHOPEDIC SURGERY      clavical     Family History:   History reviewed. No pertinent family history.    Social History:   The patient is a former smoker    Medications and Allergies:     Current Facility-Administered Medications   Medication Dose Route Frequency Provider Last Rate Last Admin    morphine 4 MG/ML injection 4 mg  4 mg Intravenous Once CHIRSTINA HdzO.        ondansetron (Zofran) syringe/vial injection 4 mg  4 mg Intravenous Once Derek Song D.O.         Current Outpatient Medications   Medication Sig Dispense Refill    acetaminophen (TYLENOL) 325 MG Tab Take 2 Tablets by mouth every 6 hours. Alternate w/ ibuprofen to take a medication every 3 hrs, take scheduled for 3 days post-op then PRN 60 Tablet 0    ibuprofen (MOTRIN) 600 MG Tab Take 1 Tablet by mouth every 6 hours. Alternate w/ tylenol to take a medication every 3 hrs, take scheduled for 3 days post-op then PRN 45 Tablet 0     "polyethylene glycol/lytes (MIRALAX) Pack Take 1 Packet by mouth every day. While taking narcotics, hold if greater then 3 BMs a day 20 Each 0    ondansetron (ZOFRAN) 8 MG Tab Take 8 mg by mouth every 8 hours as needed for Nausea/Vomiting.      pantoprazole (PROTONIX) 20 MG tablet Take 20 mg by mouth 2 times a day.       No Known Allergies    Review of Systems:   A pertinent review of systems was performed and was unremarkable except as per HPI above.    Vital Signs:   /71   Pulse 66   Temp 36.6 °C (97.9 °F) (Temporal)   Resp 16   Ht 1.651 m (5' 5\")   Wt 104 kg (230 lb 3.2 oz)   SpO2 94%   BMI 38.31 kg/m²   Vitals:    12/05/23 0800 12/05/23 0845 12/05/23 0900 12/05/23 0901   BP:  125/56 106/71    Pulse:  67 66    Resp:  18 15 16   Temp:  36.6 °C (97.9 °F)     TempSrc:  Temporal     SpO2:  98% 94%    Weight: 104 kg (230 lb 3.2 oz)      Height: 1.651 m (5' 5\")        Body mass index is 38.31 kg/m².  Oxygen Therapy:  Pulse Oximetry: 94 %, O2 Delivery Device: None - Room Air    Physical Examination:   General: Well-developed, mildly distressed appearing  Eyes: Extraocular movements intact, anicteric  HENT: Neck full range of motion, no jugular venous distension  Pulmonary: Normal respiratory effort, no distress  Cardiovascular: Regular rate, regular rhythm, palpable pain across the precordium  Gastrointestinal: Soft, non-distended, well-healing surgical sites, mild tenderness to palpation  Extremities: Warm and well perfused, no lower extremity edema  Neurological: Alert and oriented, no gross focal motor deficits  Psychiatric: Normal affect, normal judgment    Laboratories:   CrCl cannot be calculated (Patient's most recent lab result is older than the maximum 7 days allowed.).  No results for input(s): \"CREATININE\", \"BUN\", \"POTASSIUM\", \"SODIUM\", \"CALCIUM\", \"MAGNESIUM\", \"CO2\", \"ALBUMIN\" in the last 72 hours.  No results for input(s): \"GLUCOSE\", \"POCGLUCOSE\" in the last 72 hours.  No results for input(s): " "\"ASTSGOT\", \"ALTSGPT\", \"ALKPHOSPHAT\", \"INR\" in the last 72 hours.    Invalid input(s): \"BILI\"  Recent Labs     12/05/23  0853   WBC 10.7   HEMOGLOBIN 15.8   PLATELETCT 241     No results for input(s): \"TROPONINT\", \"NTPROBNP\", \"HBA1C\" in the last 72 hours.  Lab Results   Component Value Date/Time    LDL see below 06/28/2023 1326     Lab Results   Component Value Date/Time    HDL 24 (A) 06/28/2023 1326       Lab Results   Component Value Date/Time    TRIGLYCERIDE 408 (H) 06/28/2023 1326       Lab Results   Component Value Date/Time    CHOLSTRLTOT 191 06/28/2023 1326       Studies:   I personally reviewed and interpreted the patient's ECG from today as well as from 10/31/2023 and 4/4/2022, all of which demonstrate fairly similar submillimeter anterolateral ST elevations consistent with normal early repolarization pattern.        Carmelo Rivas MD, [facility]  Interventional Cardiology  [facility], [address]  [address] [place] [id number]  Phone: [phone]  Fax: [phone]            "

## 2023-12-05 NOTE — ED NOTES
"Pt comfort check. Pt states \"feeling way better\" pt denies needs at this time. Call light w/I reach vss  "

## 2023-12-05 NOTE — ED TRIAGE NOTES
Chief Complaint   Patient presents with    Chest Pain    Abdominal Pain     Pt BIB [facility] from home. Pt reports chest pain and abd pain that began yesterday. States worsened this morning.     Pt BIB as a STEMI alert via [facility]. Per EMS, elevation in V4,5 & 6. Pt reports hx of pleurisy in the past. Pt also had gall bladder removed on 11/28.      Given 0.4NTG and 1mg versed en route. VSS and pt A&Ox4, GCS 15 on arrival.     STEMI cancelled at 0848, pt to red 7, bedside report given to Lisseth BAIG. Echo at bedside

## 2023-12-06 NOTE — ED NOTES
Discharge instructions given to patient, prescriptions provided, a verbal understanding of all instructions was stated. IV removed, cathlon intact, site without s/s of infection. Pt preferred to walk out accompanied by wife VSS,  all belongings accounted for.

## 2023-12-06 NOTE — ASSESSMENT & PLAN NOTE
Reported history of Grade D esophagitis  NO RED FLAGS OTHER THAN MALE SEX (e.g. weight loss, anemia, dysphagia, age >50, family history) to warrant expedited workup  Symptoms including mediastinal distribution and triggers are strongly suggestive of GERD  History of bulimia and opiate disorder predispose him to esophageal hypersensitivity  Prescribed refills of PPI BID with BID famotidine due to severe symptoms  Prescribed antiemetics zofran and compazine  Prescribed viscous lidocaine to mix with maalox for GI cocktail for refractory symptoms  Advised follow up with [facility] for expert symptom management and determination if repeat endoscopy warranted  Discontinuation of NSAID after surgery to reduce risk of gastritis

## 2023-12-06 NOTE — ASSESSMENT & PLAN NOTE
Advised that this hematemesis is a self-limited nonfatal complication of excessive vomiting  No acute endoscopic intervention warranted  Treatment with PPI / H2B / GI Cocktail as per GERD with Esophagitis plan
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
## 2023-12-06 NOTE — ASSESSMENT & PLAN NOTE
Validated his experience that others close to him have  this year and he feels a sense of impending doom  Provided reassurance that he has had an extensive cardiopulmonary evaluation for which likelihood of SCD is extremely unlikely  He denies SI nor wish to die

## 2024-03-26 ENCOUNTER — OFFICE VISIT (OUTPATIENT)
Dept: URGENT CARE | Facility: PHYSICIAN GROUP | Age: 39
End: 2024-03-26
Payer: COMMERCIAL

## 2024-03-26 VITALS
TEMPERATURE: 100.6 F | RESPIRATION RATE: 14 BRPM | WEIGHT: 236.4 LBS | OXYGEN SATURATION: 96 % | BODY MASS INDEX: 37.99 KG/M2 | DIASTOLIC BLOOD PRESSURE: 70 MMHG | HEART RATE: 107 BPM | HEIGHT: 66 IN | SYSTOLIC BLOOD PRESSURE: 118 MMHG

## 2024-03-26 DIAGNOSIS — J10.1 INFLUENZA B: ICD-10-CM

## 2024-03-26 DIAGNOSIS — J02.9 SORE THROAT: ICD-10-CM

## 2024-03-26 DIAGNOSIS — R68.89 FLU-LIKE SYMPTOMS: ICD-10-CM

## 2024-03-26 LAB
FLUAV RNA SPEC QL NAA+PROBE: NEGATIVE
FLUBV RNA SPEC QL NAA+PROBE: POSITIVE
RSV RNA SPEC QL NAA+PROBE: NEGATIVE
S PYO DNA SPEC NAA+PROBE: NOT DETECTED
SARS-COV-2 RNA RESP QL NAA+PROBE: NEGATIVE

## 2024-03-26 PROCEDURE — 87651 STREP A DNA AMP PROBE: CPT | Performed by: FAMILY MEDICINE

## 2024-03-26 PROCEDURE — 0241U POCT CEPHEID COV-2, FLU A/B, RSV - PCR: CPT | Performed by: FAMILY MEDICINE

## 2024-03-26 PROCEDURE — 3074F SYST BP LT 130 MM HG: CPT | Performed by: FAMILY MEDICINE

## 2024-03-26 PROCEDURE — 99213 OFFICE O/P EST LOW 20 MIN: CPT | Performed by: FAMILY MEDICINE

## 2024-03-26 PROCEDURE — 3078F DIAST BP <80 MM HG: CPT | Performed by: FAMILY MEDICINE

## 2024-03-26 RX ORDER — OSELTAMIVIR PHOSPHATE 75 MG/1
75 CAPSULE ORAL 2 TIMES DAILY
Qty: 10 CAPSULE | Refills: 0 | Status: SHIPPED | OUTPATIENT
Start: 2024-03-26

## 2024-03-26 ASSESSMENT — ENCOUNTER SYMPTOMS
SORE THROAT: 1
EYES NEGATIVE: 1
CHILLS: 1
GASTROINTESTINAL NEGATIVE: 1
COUGH: 1
CARDIOVASCULAR NEGATIVE: 1
FEVER: 1

## 2024-03-26 ASSESSMENT — FIBROSIS 4 INDEX: FIB4 SCORE: 0.71

## 2024-03-26 NOTE — LETTER
[facility] [place]  [address]  [facility] [place] [facility] [address]     March 26, 2024    Patient: Sridhar Underwood   YOB: 1985   Date of Visit: 3/26/2024       To Whom It May Concern:    Sridhar Underwood was seen and treated in our department on 3/26/2024. Please excuse the rest of the week for flu like symptoms.    Sincerely,     Librado Robles M.D.

## 2024-03-26 NOTE — PROGRESS NOTES
"Subjective:   Sridhar Underwood is a 39 y.o. male who presents for Congestion (Face pressure, sore throat, fever)      Patient with fever, body aches, and sore throat, reports his son was in here this past week and tested pos for Influenza B.        Review of Systems   Constitutional:  Positive for chills, fever and malaise/fatigue.   HENT:  Positive for congestion and sore throat.    Eyes: Negative.    Respiratory:  Positive for cough.    Cardiovascular: Negative.    Gastrointestinal: Negative.    Genitourinary: Negative.        Medications, Allergies, and current problem list reviewed today in Epic.     Objective:     /70 (BP Location: Right arm, Patient Position: Sitting, BP Cuff Size: Large adult)   Pulse (!) 107   Temp (!) 38.1 °C (100.6 °F) (Temporal)   Resp 14   Ht 1.676 m (5' 6\")   Wt 107 kg (236 lb 6.4 oz)   SpO2 96%     Physical Exam  Vitals and nursing note reviewed.   Constitutional:       Appearance: Normal appearance.   HENT:      Head: Normocephalic and atraumatic.      Right Ear: Tympanic membrane normal.      Nose: Congestion present.      Mouth/Throat:      Pharynx: Posterior oropharyngeal erythema present.   Cardiovascular:      Rate and Rhythm: Regular rhythm. Tachycardia present.      Pulses: Normal pulses.      Heart sounds: Normal heart sounds.   Pulmonary:      Effort: Pulmonary effort is normal.      Breath sounds: Normal breath sounds.   Musculoskeletal:      Cervical back: Tenderness present.   Lymphadenopathy:      Cervical: Cervical adenopathy present.   Neurological:      Mental Status: He is alert.         Assessment/Plan:     Diagnosis and associated orders:     1. Influenza B  oseltamivir (TAMIFLU) 75 MG Cap      2. Sore throat  POCT CEPHEID GROUP A STREP - PCR         Comments/MDM:              Differential diagnosis, natural history, supportive care, and indications for immediate follow-up discussed.    Advised the patient to follow-up with the primary care physician " for recheck, reevaluation, and consideration of further management.    Please note that this dictation was created using voice recognition software. I have made a reasonable attempt to correct obvious errors, but I expect that there are errors of grammar and possibly content that I did not discover before finalizing the note.    This note was electronically signed by Librado Robles M.D.

## 2024-06-18 ENCOUNTER — OFFICE VISIT (OUTPATIENT)
Dept: URGENT CARE | Facility: PHYSICIAN GROUP | Age: 39
End: 2024-06-18
Payer: COMMERCIAL

## 2024-06-18 VITALS
SYSTOLIC BLOOD PRESSURE: 112 MMHG | RESPIRATION RATE: 16 BRPM | WEIGHT: 258 LBS | HEART RATE: 97 BPM | TEMPERATURE: 98.5 F | BODY MASS INDEX: 42.99 KG/M2 | DIASTOLIC BLOOD PRESSURE: 80 MMHG | OXYGEN SATURATION: 96 % | HEIGHT: 65 IN

## 2024-06-18 DIAGNOSIS — M79.601 PAIN OF RIGHT UPPER EXTREMITY: ICD-10-CM

## 2024-06-18 DIAGNOSIS — G56.00 MEDIAN NERVE COMPRESSION: ICD-10-CM

## 2024-06-18 PROCEDURE — 3079F DIAST BP 80-89 MM HG: CPT

## 2024-06-18 PROCEDURE — 3074F SYST BP LT 130 MM HG: CPT

## 2024-06-18 PROCEDURE — 99213 OFFICE O/P EST LOW 20 MIN: CPT

## 2024-06-18 RX ORDER — METHYLPREDNISOLONE 4 MG/1
TABLET ORAL
Qty: 21 TABLET | Refills: 0 | Status: SHIPPED | OUTPATIENT
Start: 2024-06-18

## 2024-06-18 ASSESSMENT — FIBROSIS 4 INDEX: FIB4 SCORE: 0.71

## 2024-06-18 ASSESSMENT — ENCOUNTER SYMPTOMS
DIZZINESS: 0
LOSS OF CONSCIOUSNESS: 0
NAUSEA: 0
STRIDOR: 0
FEVER: 0
NECK PAIN: 0
FOCAL WEAKNESS: 0
WEAKNESS: 0
FALLS: 0
VOMITING: 0
SENSORY CHANGE: 1
TINGLING: 1
SEIZURES: 0
SPEECH CHANGE: 0
BACK PAIN: 0
SHORTNESS OF BREATH: 0

## 2024-06-18 ASSESSMENT — VISUAL ACUITY: OU: 1

## 2024-06-18 NOTE — PROGRESS NOTES
Subjective     Sridhar Underwood is a 39 y.o. male who presents with right hand numbness that radiates to elbow x2 weeks.     HPI:    Sridhar is a 40yo male presenting for right hand numbness that radiates to elbow x2 weeks. Denies recent or previous injury to right upper extremity. Reports history of carpal tunnel in left upper extremity. Reports right shoulder and collar bone injury in 2012. Denies current shoulder or collar bone involvement. Reports associated mild swelling to right hand. ROM of right upper extremity is intact. No discoloration. Denies fever or shortness of breath.     Review of Systems   Constitutional:  Negative for fever.   Respiratory:  Negative for shortness of breath and stridor.    Gastrointestinal:  Negative for nausea and vomiting.   Musculoskeletal:  Positive for joint pain. Negative for back pain, falls and neck pain.   Neurological:  Positive for tingling and sensory change. Negative for dizziness, speech change, focal weakness, seizures, loss of consciousness and weakness.     Past Medical History:   Diagnosis Date    GERD (gastroesophageal reflux disease)     Heart burn     Kidney stone     PONV (postoperative nausea and vomiting)     Postoperative pain 11/28/2023      Past Surgical History:   Procedure Laterality Date    JYOTHI BY LAPAROSCOPY N/A 11/28/2023    Procedure: LAPAROSCOPIC CHOLECYSTECTOMY;  Surgeon: Danny Keenan M.D.;  Location: SURGERY [facility];  Service: General    APPENDECTOMY      OTHER ORTHOPEDIC SURGERY      rotator  right    OTHER ORTHOPEDIC SURGERY      clavical     Patient has no known allergies.     Current Outpatient Medications:     sucralfate (CARAFATE) 1 GM Tab, Take 1 g by mouth 4 Times a Day,Before Meals and at Bedtime., Disp: , Rfl:     ondansetron (ZOFRAN) 8 MG Tab, TAKE 8 MG TABLET BY MOUTH 3 TIMES A DAY AS NEEDED, Disp: , Rfl:     acetaminophen (TYLENOL) 500 MG Tab, Take 1,000 mg by mouth every 6 hours as needed for Mild Pain. 1,000 mg = 2 tabs,  "Disp: , Rfl:     ondansetron (ZOFRAN) 4 MG Tab tablet, Take 1 Tablet by mouth every four hours as needed for Nausea/Vomiting., Disp: 30 Each, Rfl: 1    pantoprazole (PROTONIX) 20 MG tablet, Take 1 Tablet by mouth 2 times a day., Disp: 60 Tablet, Rfl: 1    famotidine (PEPCID) 20 MG Tab, Take 1 Tablet by mouth 2 times a day as needed (heartburn in addition to PPI if ongoing heartburn)., Disp: 60 Tablet, Rfl: 0    lidocaine (XYLOCAINE) 2 % Solution, Take 15 mL by mouth every 6 hours as needed for Other (Severe heartburn and chest pain). Mix with 15 mL of maalox to make a GI cocktail, Disp: 300 mL, Rfl: 1    mag hydrox-al hydrox-simeth (MAALOX PLUS ES OR MYLANTA DS) 400-400-40 MG/5ML Suspension, Take 15 mL by mouth every 6 hours as needed (severe heartburn and chest pain)., Disp: 355 mL, Rfl: 0    prochlorperazine (COMPAZINE) 5 MG Tab, Take 1 Tablet by mouth every 6 hours as needed for Nausea/Vomiting (if ondansetron / zofran ineffective)., Disp: 30 Tablet, Rfl: 1    Social History     Tobacco Use    Smoking status: Former     Current packs/day: 0.00     Average packs/day: 0.3 packs/day for 20.0 years (5.0 ttl pk-yrs)     Types: Cigarettes     Quit date: 12/4/2020     Years since quitting: 3.5    Smokeless tobacco: Current     Types: Chew   Vaping Use    Vaping status: Former   Substance Use Topics    Alcohol use: Not Currently     Comment: occ.    Drug use: Yes     Types: Inhaled     Comment: marijuana      History reviewed. No pertinent family history.     Medications, Allergies, and current problem list reviewed today in Epic.      Objective     /80 (BP Location: Right arm, Patient Position: Sitting, BP Cuff Size: Adult)   Pulse 97   Temp 36.9 °C (98.5 °F) (Temporal)   Resp 16   Ht 1.651 m (5' 5\")   Wt 117 kg (258 lb)   SpO2 96%   BMI 42.93 kg/m²      Physical Exam  Vitals reviewed.   Constitutional:       General: He is not in acute distress.  HENT:      Nose: Nose normal.   Eyes:      General: Vision " grossly intact. Gaze aligned appropriately.      Extraocular Movements: Extraocular movements intact.      Conjunctiva/sclera: Conjunctivae normal.      Pupils: Pupils are equal, round, and reactive to light.   Cardiovascular:      Rate and Rhythm: Normal rate and regular rhythm.      Pulses: Normal pulses.           Radial pulses are 2+ on the right side and 2+ on the left side.      Heart sounds: Normal heart sounds.   Pulmonary:      Effort: Pulmonary effort is normal. No respiratory distress.      Breath sounds: Normal breath sounds.   Musculoskeletal:      Right shoulder: No swelling, deformity, tenderness, bony tenderness or crepitus. Normal range of motion. Normal strength. Normal pulse.      Left shoulder: Normal.      Right upper arm: No swelling, deformity, tenderness or bony tenderness.      Left upper arm: Normal.      Right elbow: No swelling or deformity. Normal range of motion. No tenderness.      Left elbow: Normal.      Right forearm: Tenderness present. No swelling or bony tenderness.      Left forearm: Normal.      Right wrist: No swelling, deformity, tenderness, bony tenderness, snuff box tenderness or crepitus. Normal range of motion. Normal pulse.      Left wrist: Normal.      Right hand: Swelling and tenderness present. No deformity or bony tenderness. Normal range of motion. Normal strength. Normal sensation. There is no disruption of two-point discrimination. Normal capillary refill. Normal pulse.      Left hand: Normal.      Cervical back: Full passive range of motion without pain, normal range of motion and neck supple.   Skin:     General: Skin is warm and dry.      Capillary Refill: Capillary refill takes less than 2 seconds.   Neurological:      Mental Status: He is alert. Mental status is at baseline.   Psychiatric:         Mood and Affect: Mood normal.         Behavior: Behavior normal.         Thought Content: Thought content normal.       Assessment & Plan     1. Median nerve  compression   - methylPREDNISolone (MEDROL DOSEPAK) 4 MG Tablet Therapy Pack; Follow schedule on package instructions.  Dispense: 21 Tablet; Refill: 0  - Referral to Orthopedics    2. Pain of right upper extremity      MDM/Comments:   History and examination consistent with tendinitis vs. median nerve compression. No bony tenderness or injury. Referral to Orthopedics placed for further evaluation. No red flag/alarm feature findings present on history or examination. Will treat with Medrol dosepak.     Illness progression and alarm symptoms discussed with patient, emphasizing low threshold for returning to clinic/emergency department for worsening symptoms. Patient is agreeable to the plan and verbalizes understanding, and will follow up if warranted.       Differential diagnosis, natural history, supportive care, and indications for immediate follow-up discussed.      Follow-up as needed if symptoms worsen or fail to improve to PCP, Urgent care or Emergency Room.                     Electronically signed by VALERIE Burns

## 2024-06-18 NOTE — LETTER
[facility] [place]  [address]  [place] [address]     June 18, 2024    Patient: Sridhar Underwood   YOB: 1985   Date of Visit: 6/18/2024       To Whom It May Concern:    Sridhar Underwood was seen and treated in our department on 6/18/2024.     Please excuse Sridhar from work 6/18/24-6/19/24.   He may return on 6/19/24 if symptoms improve.         Sincerely,     SEBASTIEN Pineda.

## 2024-10-10 ENCOUNTER — HOSPITAL ENCOUNTER (OUTPATIENT)
Dept: LAB | Facility: MEDICAL CENTER | Age: 39
End: 2024-10-10
Attending: FAMILY MEDICINE
Payer: COMMERCIAL

## 2024-10-10 LAB
ALBUMIN SERPL BCP-MCNC: 4.6 G/DL (ref 3.2–4.9)
ALBUMIN/GLOB SERPL: 1.4 G/DL
ALP SERPL-CCNC: 99 U/L (ref 30–99)
ALT SERPL-CCNC: 95 U/L (ref 2–50)
ANION GAP SERPL CALC-SCNC: 18 MMOL/L (ref 7–16)
AST SERPL-CCNC: 39 U/L (ref 12–45)
BILIRUB SERPL-MCNC: 0.7 MG/DL (ref 0.1–1.5)
BUN SERPL-MCNC: 16 MG/DL (ref 8–22)
CALCIUM ALBUM COR SERPL-MCNC: 9 MG/DL (ref 8.5–10.5)
CALCIUM SERPL-MCNC: 9.5 MG/DL (ref 8.5–10.5)
CHLORIDE SERPL-SCNC: 102 MMOL/L (ref 96–112)
CHOLEST SERPL-MCNC: 214 MG/DL (ref 100–199)
CO2 SERPL-SCNC: 20 MMOL/L (ref 20–33)
CREAT SERPL-MCNC: 0.67 MG/DL (ref 0.5–1.4)
ERYTHROCYTE [DISTWIDTH] IN BLOOD BY AUTOMATED COUNT: 44.6 FL (ref 35.9–50)
ERYTHROCYTE [SEDIMENTATION RATE] IN BLOOD BY WESTERGREN METHOD: 7 MM/HOUR (ref 0–20)
EST. AVERAGE GLUCOSE BLD GHB EST-MCNC: 126 MG/DL
GFR SERPLBLD CREATININE-BSD FMLA CKD-EPI: 122 ML/MIN/1.73 M 2
GLOBULIN SER CALC-MCNC: 3.4 G/DL (ref 1.9–3.5)
GLUCOSE SERPL-MCNC: 100 MG/DL (ref 65–99)
HBA1C MFR BLD: 6 % (ref 4–5.6)
HCT VFR BLD AUTO: 48.1 % (ref 42–52)
HDLC SERPL-MCNC: 29 MG/DL
HGB BLD-MCNC: 15.9 G/DL (ref 14–18)
LDLC SERPL CALC-MCNC: 150 MG/DL
MCH RBC QN AUTO: 29.9 PG (ref 27–33)
MCHC RBC AUTO-ENTMCNC: 33.1 G/DL (ref 32.3–36.5)
MCV RBC AUTO: 90.6 FL (ref 81.4–97.8)
PLATELET # BLD AUTO: 265 K/UL (ref 164–446)
PMV BLD AUTO: 11.1 FL (ref 9–12.9)
POTASSIUM SERPL-SCNC: 4.1 MMOL/L (ref 3.6–5.5)
PROT SERPL-MCNC: 8 G/DL (ref 6–8.2)
RBC # BLD AUTO: 5.31 M/UL (ref 4.7–6.1)
RHEUMATOID FACT SER IA-ACNC: <10 IU/ML (ref 0–14)
SODIUM SERPL-SCNC: 140 MMOL/L (ref 135–145)
TRIGL SERPL-MCNC: 175 MG/DL (ref 0–149)
UREA BREATH TEST QL: NEGATIVE
WBC # BLD AUTO: 11.6 K/UL (ref 4.8–10.8)

## 2024-10-10 PROCEDURE — 83036 HEMOGLOBIN GLYCOSYLATED A1C: CPT

## 2024-10-10 PROCEDURE — 36415 COLL VENOUS BLD VENIPUNCTURE: CPT

## 2024-10-10 PROCEDURE — 80053 COMPREHEN METABOLIC PANEL: CPT

## 2024-10-10 PROCEDURE — 80061 LIPID PANEL: CPT

## 2024-10-10 PROCEDURE — 83013 H PYLORI (C-13) BREATH: CPT

## 2024-10-10 PROCEDURE — 86431 RHEUMATOID FACTOR QUANT: CPT

## 2024-10-10 PROCEDURE — 85652 RBC SED RATE AUTOMATED: CPT

## 2024-10-10 PROCEDURE — 85027 COMPLETE CBC AUTOMATED: CPT

## 2024-12-12 ENCOUNTER — OFFICE VISIT (OUTPATIENT)
Dept: URGENT CARE | Facility: CLINIC | Age: 39
End: 2024-12-12
Payer: COMMERCIAL

## 2024-12-12 VITALS
RESPIRATION RATE: 16 BRPM | HEART RATE: 76 BPM | OXYGEN SATURATION: 97 % | TEMPERATURE: 97.5 F | HEIGHT: 66 IN | BODY MASS INDEX: 44.29 KG/M2 | SYSTOLIC BLOOD PRESSURE: 120 MMHG | DIASTOLIC BLOOD PRESSURE: 78 MMHG | WEIGHT: 275.57 LBS

## 2024-12-12 DIAGNOSIS — J06.9 URI, ACUTE: ICD-10-CM

## 2024-12-12 DIAGNOSIS — J02.9 PHARYNGITIS, UNSPECIFIED ETIOLOGY: ICD-10-CM

## 2024-12-12 LAB
FLUAV RNA SPEC QL NAA+PROBE: NEGATIVE
FLUBV RNA SPEC QL NAA+PROBE: NEGATIVE
RSV RNA SPEC QL NAA+PROBE: NEGATIVE
S PYO DNA SPEC NAA+PROBE: NOT DETECTED
SARS-COV-2 RNA RESP QL NAA+PROBE: NEGATIVE

## 2024-12-12 PROCEDURE — 3074F SYST BP LT 130 MM HG: CPT

## 2024-12-12 PROCEDURE — 3078F DIAST BP <80 MM HG: CPT

## 2024-12-12 PROCEDURE — 87651 STREP A DNA AMP PROBE: CPT

## 2024-12-12 PROCEDURE — 0241U POCT CEPHEID COV-2, FLU A/B, RSV - PCR: CPT

## 2024-12-12 PROCEDURE — 99213 OFFICE O/P EST LOW 20 MIN: CPT

## 2024-12-12 ASSESSMENT — ENCOUNTER SYMPTOMS
SHORTNESS OF BREATH: 0
WHEEZING: 0
CHILLS: 0
BLURRED VISION: 0
COUGH: 1
DOUBLE VISION: 0
MYALGIAS: 1
PALPITATIONS: 0
DIZZINESS: 0
NAUSEA: 0
BLOOD IN STOOL: 0
WEAKNESS: 0
DIARRHEA: 0
FEVER: 0
SORE THROAT: 1
ABDOMINAL PAIN: 0
VOMITING: 0
TINGLING: 0
SINUS PAIN: 0
SPUTUM PRODUCTION: 0
HEADACHES: 1

## 2024-12-12 ASSESSMENT — FIBROSIS 4 INDEX: FIB4 SCORE: 0.57

## 2024-12-12 NOTE — LETTER
December 12, 2024         Patient: Sridhar Underwood   YOB: 1985   Date of Visit: 12/12/2024           To Whom it May Concern:    Sridhar Underwood was seen in my clinic on 12/12/2024. He may return to work on 12/17/2024 or sooner if his symptoms improve.    If you have any questions or concerns, please don't hesitate to call.        Sincerely,           SEBASTIEN Shearer.  Electronically Signed

## 2024-12-12 NOTE — RESULT ENCOUNTER NOTE
Sent patient MyChart message regarding negative flu and viral swabs and to continue OTC pharmacologic management as well as rest and fluids for his likely viral URI.

## 2024-12-12 NOTE — PROGRESS NOTES
Subjective:   Sridhar Underwood is a 39 y.o. male who presents for Sore Throat (Since yesterday), Headache, Nasal Congestion, and Body Aches    Patient presents to the clinic for complaints of Sore throat, Headache, nasal congestion and body aches since yesterday morning. States had pink-tinged spit this am when he was coughing really hard from his sore throat, states that the patient has had this before due to his grade D GERD, which the patient states is in remission given his negative endoscopy with his gastroenterologist last week.  Has taken chloraseptic spray with minimum improvement.  Patient denies chest pain, shortness of breath, difficulty breathing or swallowing or talking, dizziness, lightheadedness, fever, vomiting, or diarrhea.    Headache      Review of Systems   Constitutional:  Positive for malaise/fatigue. Negative for chills and fever.   HENT:  Positive for congestion and sore throat. Negative for ear pain and sinus pain.    Eyes:  Negative for blurred vision and double vision.   Respiratory:  Positive for cough. Negative for sputum production, shortness of breath and wheezing.    Cardiovascular:  Negative for chest pain and palpitations.   Gastrointestinal:  Negative for abdominal pain, blood in stool, diarrhea, melena, nausea and vomiting.   Genitourinary:  Negative for dysuria.   Musculoskeletal:  Positive for myalgias.   Neurological:  Positive for headaches. Negative for dizziness, tingling and weakness.   All other systems reviewed and are negative.    Refer to HPI for additional details.    During this visit, appropriate PPE was worn, and hand hygiene was performed.    PMH:  has a past medical history of GERD (gastroesophageal reflux disease), Heart burn, Kidney stone, PONV (postoperative nausea and vomiting), and Postoperative pain (11/28/2023).    He has no past medical history of Acute nasopharyngitis, Anesthesia, Arrhythmia, Arthritis, Asthma, Bowel habit changes, Breath shortness,  Bronchitis, Cancer (HCC), Carcinoma in situ of respiratory system, Cataract, Congestive heart failure (HCC), Continuous ambulatory peritoneal dialysis status (HCC), Coughing blood, Dental disorder, Diabetes (HCC), Dialysis patient (HCC), Disorder of thyroid, Emphysema of lung (HCC), Glaucoma, Gynecological disorder, Heart valve disease, Hemorrhagic disorder (HCC), Hepatitis A, Hepatitis B, Hepatitis C, Hiatus hernia syndrome, High cholesterol, Hypertension, Indigestion, Infectious disease, Jaundice, Myocardial infarct (HCC), Pacemaker, Pneumonia, Pregnant, Psychiatric problem, Rheumatic fever, Seizure (HCC), Sleep apnea, Snoring, Stroke (HCC), Tuberculosis, Urinary bladder disorder, or Urinary incontinence.    MEDS:   Current Outpatient Medications:     omeprazole (PRILOSEC) 20 MG delayed-release capsule, Take 20 mg by mouth every day., Disp: , Rfl:     ondansetron (ZOFRAN) 8 MG Tab, TAKE 8 MG TABLET BY MOUTH 3 TIMES A DAY AS NEEDED, Disp: , Rfl:     acetaminophen (TYLENOL) 500 MG Tab, Take 1,000 mg by mouth every 6 hours as needed for Mild Pain. 1,000 mg = 2 tabs, Disp: , Rfl:     ondansetron (ZOFRAN) 4 MG Tab tablet, Take 1 Tablet by mouth every four hours as needed for Nausea/Vomiting., Disp: 30 Each, Rfl: 1    lidocaine (XYLOCAINE) 2 % Solution, Take 15 mL by mouth every 6 hours as needed for Other (Severe heartburn and chest pain). Mix with 15 mL of maalox to make a GI cocktail, Disp: 300 mL, Rfl: 1    mag hydrox-al hydrox-simeth (MAALOX PLUS ES OR MYLANTA DS) 400-400-40 MG/5ML Suspension, Take 15 mL by mouth every 6 hours as needed (severe heartburn and chest pain)., Disp: 355 mL, Rfl: 0    methylPREDNISolone (MEDROL DOSEPAK) 4 MG Tablet Therapy Pack, Follow schedule on package instructions. (Patient not taking: Reported on 12/12/2024), Disp: 21 Tablet, Rfl: 0    oseltamivir (TAMIFLU) 75 MG Cap, Take 1 Capsule by mouth 2 times a day. (Patient not taking: Reported on 12/12/2024), Disp: 10 Capsule, Rfl: 0     "sucralfate (CARAFATE) 1 GM Tab, Take 1 g by mouth 4 Times a Day,Before Meals and at Bedtime. (Patient not taking: Reported on 12/12/2024), Disp: , Rfl:     pantoprazole (PROTONIX) 20 MG tablet, Take 1 Tablet by mouth 2 times a day. (Patient not taking: Reported on 12/12/2024), Disp: 60 Tablet, Rfl: 1    famotidine (PEPCID) 20 MG Tab, Take 1 Tablet by mouth 2 times a day as needed (heartburn in addition to PPI if ongoing heartburn). (Patient not taking: Reported on 12/12/2024), Disp: 60 Tablet, Rfl: 0    prochlorperazine (COMPAZINE) 5 MG Tab, Take 1 Tablet by mouth every 6 hours as needed for Nausea/Vomiting (if ondansetron / zofran ineffective). (Patient not taking: Reported on 12/12/2024), Disp: 30 Tablet, Rfl: 1    ALLERGIES: No Known Allergies  SURGHX:   Past Surgical History:   Procedure Laterality Date    JYOTHI BY LAPAROSCOPY N/A 11/28/2023    Procedure: LAPAROSCOPIC CHOLECYSTECTOMY;  Surgeon: Danny Keenan M.D.;  Location: SURGERY [facility];  Service: General    APPENDECTOMY      OTHER ORTHOPEDIC SURGERY      rotator  right    OTHER ORTHOPEDIC SURGERY      clavical     SOCHX:  reports that he quit smoking about 4 years ago. His smoking use included cigarettes. He has a 5 pack-year smoking history. His smokeless tobacco use includes chew. He reports that he does not currently use alcohol. He reports current drug use. Drug: Inhaled.    FH: Per HPI as applicable/pertinent.    Medications, Allergies, and current problem list reviewed today in Epic.     Objective:     /78   Pulse 76   Temp 36.4 °C (97.5 °F) (Temporal)   Resp 16   Ht 1.664 m (5' 5.5\")   Wt 125 kg (275 lb 9.2 oz)   SpO2 97%     Physical Exam  Constitutional:       Appearance: He is ill-appearing. He is not toxic-appearing.   HENT:      Head: Normocephalic.      Right Ear: Tympanic membrane, ear canal and external ear normal.      Left Ear: Tympanic membrane, ear canal and external ear normal.      Nose: Congestion present. No " rhinorrhea.      Mouth/Throat:      Mouth: Mucous membranes are moist.      Pharynx: Oropharynx is clear. Posterior oropharyngeal erythema present. No oropharyngeal exudate.      Comments: Pharynx erythematous and injected, negative for exudates, bilateral 2+ tonsils, uvula inflamed but midline, airway clearly patent, no signs of pharyngeal or tonsillar abscess.  Eyes:      General:         Right eye: No discharge.         Left eye: No discharge.      Extraocular Movements: Extraocular movements intact.      Conjunctiva/sclera: Conjunctivae normal.      Pupils: Pupils are equal, round, and reactive to light.   Cardiovascular:      Rate and Rhythm: Normal rate and regular rhythm.      Pulses: Normal pulses.      Heart sounds: Normal heart sounds. No murmur heard.  Pulmonary:      Effort: Pulmonary effort is normal. No respiratory distress.      Breath sounds: Normal breath sounds. No wheezing or rhonchi.   Abdominal:      General: Abdomen is flat.   Musculoskeletal:         General: No signs of injury. Normal range of motion.      Cervical back: Normal range of motion. No rigidity.   Lymphadenopathy:      Cervical: No cervical adenopathy.   Skin:     General: Skin is warm and dry.   Neurological:      General: No focal deficit present.      Mental Status: He is alert and oriented to person, place, and time.      Motor: No weakness.         Assessment/Plan:     Diagnosis and associated orders:     1. Pharyngitis, unspecified etiology  - POCT CEPHEID GROUP A STREP - PCR  - POCT CEPHEID COV-2, FLU A/B, RSV - PCR    2. URI, acute  - POCT CEPHEID GROUP A STREP - PCR  - POCT CEPHEID COV-2, FLU A/B, RSV - PCR    Other orders  - omeprazole (PRILOSEC) 20 MG delayed-release capsule; Take 20 mg by mouth every day.     Comments/MDM:     Discussed with patient that likely etiology of his symptoms is viral versus bacterial URI.  Strep swab and viral swab collected in clinic.  Instructed patient that he will receive a call versus  MyChart message from this provider regarding the results from the swabs and any changes with medication/treatment plan as appropriate.  Advised patient to continue OTC pharmacological treatment of his symptoms as well as home care measures, including but not limited to warm salt gargles and plenty of rest/fluids.  Discussed with patient that likely etiology of his pink-tinged spit is from his aggressive coughing causing irritation and minor bleeding in his pharynx.  Given the isolated and single-episode nature of this occurrence, low suspicion for high mortality/morbidity issues and further workup is not warranted at this time.  Patient agreeable to this plan of care.  All patient questions answered.  RTC if symptoms persist and/or worsen.  Red flag symptoms warranting emergency medical services discussed, including but not limited to fever greater than 103F, continued pain-tinged or bloody red sputum or spit, chest pain, shortness of breath, difficulty speaking or swallowing, sensation of throat closing, numbness/tingling, weakness, nausea/vomiting/diarrhea.         Differential diagnosis, natural history, supportive care, and indications for immediate follow-up discussed.    Advised the patient to follow-up with the primary care physician for recheck, reevaluation, and consideration of further management.    Instructed patient to seek emergency medical attention via calling EMS or going to the Emergency Room for red flag symptoms, including but not limited to: chest pain, palpitations, fever greater than 103F, shortness of breath, wheezing, new or worsened numbness/tingling, focal or unilateral weakness, and bloody vomit/stool.     Please note that this dictation was created using voice recognition software. I have made a reasonable attempt to correct obvious errors, but I expect that there are errors of grammar and possibly content that I did not discover before finalizing the note.    This note was  electronically signed by JIMBO Shearer

## 2025-02-02 ENCOUNTER — HOSPITAL ENCOUNTER (EMERGENCY)
Facility: MEDICAL CENTER | Age: 40
End: 2025-02-02
Attending: EMERGENCY MEDICINE
Payer: COMMERCIAL

## 2025-02-02 ENCOUNTER — APPOINTMENT (OUTPATIENT)
Dept: RADIOLOGY | Facility: MEDICAL CENTER | Age: 40
End: 2025-02-02
Attending: EMERGENCY MEDICINE
Payer: COMMERCIAL

## 2025-02-02 VITALS
TEMPERATURE: 98.2 F | HEIGHT: 65 IN | HEART RATE: 64 BPM | DIASTOLIC BLOOD PRESSURE: 80 MMHG | RESPIRATION RATE: 18 BRPM | BODY MASS INDEX: 46.65 KG/M2 | WEIGHT: 280 LBS | SYSTOLIC BLOOD PRESSURE: 139 MMHG | OXYGEN SATURATION: 94 %

## 2025-02-02 DIAGNOSIS — R11.2 NAUSEA AND VOMITING, UNSPECIFIED VOMITING TYPE: ICD-10-CM

## 2025-02-02 DIAGNOSIS — R10.11 RIGHT UPPER QUADRANT ABDOMINAL PAIN: ICD-10-CM

## 2025-02-02 LAB
ALBUMIN SERPL BCP-MCNC: 3.9 G/DL (ref 3.2–4.9)
ALBUMIN/GLOB SERPL: 1.2 G/DL
ALP SERPL-CCNC: 84 U/L (ref 30–99)
ALT SERPL-CCNC: 84 U/L (ref 2–50)
ANION GAP SERPL CALC-SCNC: 11 MMOL/L (ref 7–16)
APPEARANCE UR: CLEAR
AST SERPL-CCNC: 49 U/L (ref 12–45)
BASOPHILS # BLD AUTO: 0.6 % (ref 0–1.8)
BASOPHILS # BLD: 0.05 K/UL (ref 0–0.12)
BILIRUB SERPL-MCNC: 0.4 MG/DL (ref 0.1–1.5)
BILIRUB UR QL STRIP.AUTO: NEGATIVE
BUN SERPL-MCNC: 13 MG/DL (ref 8–22)
CALCIUM ALBUM COR SERPL-MCNC: 8.9 MG/DL (ref 8.5–10.5)
CALCIUM SERPL-MCNC: 8.8 MG/DL (ref 8.5–10.5)
CHLORIDE SERPL-SCNC: 104 MMOL/L (ref 96–112)
CO2 SERPL-SCNC: 21 MMOL/L (ref 20–33)
COLOR UR: YELLOW
CREAT SERPL-MCNC: 0.72 MG/DL (ref 0.5–1.4)
D DIMER PPP IA.FEU-MCNC: <0.27 UG/ML (FEU) (ref 0–0.5)
EKG IMPRESSION: NORMAL
EOSINOPHIL # BLD AUTO: 0.1 K/UL (ref 0–0.51)
EOSINOPHIL NFR BLD: 1.1 % (ref 0–6.9)
ERYTHROCYTE [DISTWIDTH] IN BLOOD BY AUTOMATED COUNT: 42.8 FL (ref 35.9–50)
FLUAV RNA SPEC QL NAA+PROBE: NEGATIVE
FLUBV RNA SPEC QL NAA+PROBE: NEGATIVE
GFR SERPLBLD CREATININE-BSD FMLA CKD-EPI: 119 ML/MIN/1.73 M 2
GLOBULIN SER CALC-MCNC: 3.3 G/DL (ref 1.9–3.5)
GLUCOSE SERPL-MCNC: 88 MG/DL (ref 65–99)
GLUCOSE UR STRIP.AUTO-MCNC: NEGATIVE MG/DL
HCT VFR BLD AUTO: 41.4 % (ref 42–52)
HGB BLD-MCNC: 14.1 G/DL (ref 14–18)
IMM GRANULOCYTES # BLD AUTO: 0.07 K/UL (ref 0–0.11)
IMM GRANULOCYTES NFR BLD AUTO: 0.8 % (ref 0–0.9)
KETONES UR STRIP.AUTO-MCNC: NEGATIVE MG/DL
LEUKOCYTE ESTERASE UR QL STRIP.AUTO: NEGATIVE
LIPASE SERPL-CCNC: 18 U/L (ref 11–82)
LYMPHOCYTES # BLD AUTO: 2.02 K/UL (ref 1–4.8)
LYMPHOCYTES NFR BLD: 22.5 % (ref 22–41)
MCH RBC QN AUTO: 30.7 PG (ref 27–33)
MCHC RBC AUTO-ENTMCNC: 34.1 G/DL (ref 32.3–36.5)
MCV RBC AUTO: 90 FL (ref 81.4–97.8)
MICRO URNS: NORMAL
MONOCYTES # BLD AUTO: 0.8 K/UL (ref 0–0.85)
MONOCYTES NFR BLD AUTO: 8.9 % (ref 0–13.4)
NEUTROPHILS # BLD AUTO: 5.92 K/UL (ref 1.82–7.42)
NEUTROPHILS NFR BLD: 66.1 % (ref 44–72)
NITRITE UR QL STRIP.AUTO: NEGATIVE
NRBC # BLD AUTO: 0 K/UL
NRBC BLD-RTO: 0 /100 WBC (ref 0–0.2)
PH UR STRIP.AUTO: 7 [PH] (ref 5–8)
PLATELET # BLD AUTO: 190 K/UL (ref 164–446)
PMV BLD AUTO: 10.8 FL (ref 9–12.9)
POTASSIUM SERPL-SCNC: 3.6 MMOL/L (ref 3.6–5.5)
PROT SERPL-MCNC: 7.2 G/DL (ref 6–8.2)
PROT UR QL STRIP: NEGATIVE MG/DL
RBC # BLD AUTO: 4.6 M/UL (ref 4.7–6.1)
RBC UR QL AUTO: NEGATIVE
RSV RNA SPEC QL NAA+PROBE: NEGATIVE
SARS-COV-2 RNA RESP QL NAA+PROBE: NOTDETECTED
SODIUM SERPL-SCNC: 136 MMOL/L (ref 135–145)
SP GR UR STRIP.AUTO: 1.03
TROPONIN T SERPL-MCNC: 6 NG/L (ref 6–19)
UROBILINOGEN UR STRIP.AUTO-MCNC: 1 EU/DL
WBC # BLD AUTO: 9 K/UL (ref 4.8–10.8)

## 2025-02-02 PROCEDURE — 700111 HCHG RX REV CODE 636 W/ 250 OVERRIDE (IP): Performed by: EMERGENCY MEDICINE

## 2025-02-02 PROCEDURE — 36415 COLL VENOUS BLD VENIPUNCTURE: CPT

## 2025-02-02 PROCEDURE — 99285 EMERGENCY DEPT VISIT HI MDM: CPT

## 2025-02-02 PROCEDURE — 84484 ASSAY OF TROPONIN QUANT: CPT

## 2025-02-02 PROCEDURE — 93005 ELECTROCARDIOGRAM TRACING: CPT | Mod: TC | Performed by: EMERGENCY MEDICINE

## 2025-02-02 PROCEDURE — A9270 NON-COVERED ITEM OR SERVICE: HCPCS | Performed by: EMERGENCY MEDICINE

## 2025-02-02 PROCEDURE — 96375 TX/PRO/DX INJ NEW DRUG ADDON: CPT

## 2025-02-02 PROCEDURE — 85379 FIBRIN DEGRADATION QUANT: CPT

## 2025-02-02 PROCEDURE — 83690 ASSAY OF LIPASE: CPT

## 2025-02-02 PROCEDURE — 71045 X-RAY EXAM CHEST 1 VIEW: CPT

## 2025-02-02 PROCEDURE — 0241U HCHG SARS-COV-2 COVID-19 NFCT DS RESP RNA 4 TRGT ED POC: CPT

## 2025-02-02 PROCEDURE — 700102 HCHG RX REV CODE 250 W/ 637 OVERRIDE(OP): Performed by: EMERGENCY MEDICINE

## 2025-02-02 PROCEDURE — 85025 COMPLETE CBC W/AUTO DIFF WBC: CPT

## 2025-02-02 PROCEDURE — 96374 THER/PROPH/DIAG INJ IV PUSH: CPT

## 2025-02-02 PROCEDURE — 80053 COMPREHEN METABOLIC PANEL: CPT

## 2025-02-02 PROCEDURE — 81003 URINALYSIS AUTO W/O SCOPE: CPT

## 2025-02-02 RX ORDER — METOCLOPRAMIDE HYDROCHLORIDE 5 MG/ML
10 INJECTION INTRAMUSCULAR; INTRAVENOUS ONCE
Status: DISCONTINUED | OUTPATIENT
Start: 2025-02-02 | End: 2025-02-02 | Stop reason: HOSPADM

## 2025-02-02 RX ORDER — ONDANSETRON 4 MG/1
4 TABLET, ORALLY DISINTEGRATING ORAL EVERY 6 HOURS PRN
Qty: 10 TABLET | Refills: 0 | Status: SHIPPED | OUTPATIENT
Start: 2025-02-02

## 2025-02-02 RX ORDER — DIPHENHYDRAMINE HYDROCHLORIDE 50 MG/ML
25 INJECTION INTRAMUSCULAR; INTRAVENOUS ONCE
Status: COMPLETED | OUTPATIENT
Start: 2025-02-02 | End: 2025-02-02

## 2025-02-02 RX ORDER — KETOROLAC TROMETHAMINE 15 MG/ML
15 INJECTION, SOLUTION INTRAMUSCULAR; INTRAVENOUS ONCE
Status: COMPLETED | OUTPATIENT
Start: 2025-02-02 | End: 2025-02-02

## 2025-02-02 RX ORDER — ONDANSETRON 2 MG/ML
4 INJECTION INTRAMUSCULAR; INTRAVENOUS ONCE
Status: COMPLETED | OUTPATIENT
Start: 2025-02-02 | End: 2025-02-02

## 2025-02-02 RX ADMIN — KETOROLAC TROMETHAMINE 15 MG: 15 INJECTION, SOLUTION INTRAMUSCULAR; INTRAVENOUS at 14:51

## 2025-02-02 RX ADMIN — DIPHENHYDRAMINE HYDROCHLORIDE 25 MG: 50 INJECTION, SOLUTION INTRAMUSCULAR; INTRAVENOUS at 16:27

## 2025-02-02 RX ADMIN — ONDANSETRON 4 MG: 2 INJECTION INTRAMUSCULAR; INTRAVENOUS at 14:51

## 2025-02-02 RX ADMIN — LIDOCAINE HYDROCHLORIDE 30 ML: 20 SOLUTION ORAL; TOPICAL at 14:51

## 2025-02-02 ASSESSMENT — FIBROSIS 4 INDEX: FIB4 SCORE: 0.57

## 2025-02-02 NOTE — ED TRIAGE NOTES
Chief Complaint   Patient presents with    Nausea/Vomiting/Diarrhea     Onset Friday afternoon    Abdominal Pain     Intermittent RUQ pain 10/10     PT BIB EMS for above complaint. PT has a history of cholecystectomy 1 year ago. PT has a hx of gout and was prescribed indomethacin and began taking it Thursday night and then began having above symptoms.

## 2025-02-02 NOTE — ED PROVIDER NOTES
ED Provider Note    Primary care provider: Mohsen Tamasaby, M.D.    CHIEF COMPLAINT  Chief Complaint   Patient presents with    Nausea/Vomiting/Diarrhea     Onset Friday afternoon    Abdominal Pain     Intermittent RUQ pain 10/10       HPI  Sridhar Underwood is a 39 y.o. male who presents to the Emergency Department for intermittent severe right upper quadrant abdominal pain.  The patient states he started having nausea and vomiting 48 hours ago, had additional vomiting yesterday, attempted to go to work today and felt very lightheaded with the start of the stabbing intermittent severe upper quadrant abdominal pain without any modifying factors.  He is taking indomethacin for gout and omeprazole for GERD.    Would like to avoid any opioids as he is in recovery.      External Record Review: Patient had a follow-up in the GI clinic earlier this month to follow-up on bloating, intermittent diarrhea, dyspepsia, right upper quadrant pain.  He has prescriptions for loperamide, famotidine, Dexatran, omeprazole, sucralfate and pantoprazole.  Had a rectal quadrant ultrasound October 2023 for pain, gallbladder was normal at that time.  Had a CT December 2023 for abdominal pain, at that time he was status post cholecystectomy.    He was here in December 2023 for severe chest and abdominal pain.  The patient underwent a CT of the chest, CT abdomen pelvis, serial troponins, workup was completely unrevealing.    PAST MEDICAL HISTORY   has a past medical history of GERD (gastroesophageal reflux disease), Heart burn, Kidney stone, PONV (postoperative nausea and vomiting), and Postoperative pain (11/28/2023).    SURGICAL HISTORY   has a past surgical history that includes appendectomy; other orthopedic surgery; other orthopedic surgery; and melanie by laparoscopy (N/A, 11/28/2023).    SOCIAL HISTORY  Social History     Tobacco Use    Smoking status: Former     Current packs/day: 0.00     Average packs/day: 0.3 packs/day for 20.0  "years (5.0 ttl pk-yrs)     Types: Cigarettes     Quit date: 2020     Years since quittin.1    Smokeless tobacco: Current     Types: Chew   Vaping Use    Vaping status: Former   Substance Use Topics    Alcohol use: Not Currently     Comment: rare    Drug use: Yes     Types: Inhaled     Comment: marijuana      Social History     Substance and Sexual Activity   Drug Use Yes    Types: Inhaled    Comment: marijuana       PHYSICAL EXAM  VITAL SIGNS: /80   Pulse 64   Temp 36.8 °C (98.2 °F) (Temporal)   Resp 18   Ht 1.651 m (5' 5\")   Wt (!) 127 kg (280 lb)   SpO2 94%   BMI 46.59 kg/m²   Constitutional: Awake, alert in no apparent distress.  HENT: Normocephalic, Bilateral external ears normal. Nose normal.   Eyes: Conjunctiva normal, non-icteric, EOMI.    Thorax & Lungs: Easy unlabored respirations, Clear to ascultation bilaterally.  Cardiovascular: Regular rate, Regular rhythm, No murmurs, rubs or gallops. Bilateral pulses symmetrical.   Abdomen:  Soft, there is some moderate upper quadrant abdominal tenderness without rebound or guarding, deep palpation of the rest of the abdomen does not cause any pain or referred pain to the right upper quadrant.  :    Skin: Visualized skin is  Dry, No erythema, No rash.   Musculoskeletal:   No cyanosis, clubbing or edema. No leg asymmetry.   Neurologic: Alert, Grossly non-focal.   Psychiatric: Anxious  Lymphatic:      EKG   12 lead Interpreted by me  Rhythm:  Normal sinus rhythm   Rate: 57  Axis: normal  Ectopy: none  Conduction: normal  ST Segments: no acute change  T Waves: no acute change  Clinical Impression: Normal EKG without acute changes       RADIOLOGY  I have independently interpreted the diagnostic imaging associated with this visit.   My preliminary interpretation is as follows: No cardiomegaly    Radiologist interpretation:   DX-CHEST-PORTABLE (1 VIEW)   Final Result      No acute cardiopulmonary disease.          COURSE & MEDICAL DECISION " MAKING  Pertinent Labs & Imaging studies reviewed. (See chart for details)    COURSE & MEDICAL DECISION MAKING  Pertinent Labs & Imaging studies reviewed. (See chart for details)    Differential diagnoses include but are not limited to: Gastritis, peptic ulcer disease, dyspepsia, viral syndrome, less likely ACS, pulmonary embolus, right lower lobe pneumonia    2:00 PM - Nursing notes reviewed, patient seen and examined at bedside.    4:30 PM patient states he still feels nauseated.  Will administer some Reglan.    6 PM patient able to tolerate p.o., anticipate discharge shortly.    Escalation of care considered, and ultimately not performed: acute inpatient care management, however at this time, the patient is most appropriate for outpatient management.        Decision tools and prescription drugs considered including, but not limited to: Heart score 2    Decision Making:  This is a pleasant 39 y.o. year old male who presents with right upper quadrant abdominal pain, nausea and vomiting.  Differential as above.  Labs are unrevealing, patient has no leukocytosis or leftward shift, viral swabs negative, troponin negative, lipase negative, D-dimer negative.  The patient is low Wells criteria, do not suspect pulmonary embolus at this time.    He is status post cholecystectomy and appendectomy, no indication for CT of the abdomen pelvis.  Pain could possibly be from peptic ulcer disease or gastritis, recommend continuing on with omeprazole, consider follow-up with primary and/or GI.  Viral process is certainly a possibility as well, will discharge with a prescription for Zofran.     The patient was discharged home (see d/c instructions) was told to return immediately for any signs or symptoms listed, or any worsening at all.  The patient verbally agreed to the discharge precautions and follow-up plan which is documented in EPIC.    Discharge Medications:  Discharge Medication List as of 2/2/2025  6:28 PM        START  taking these medications    Details   ondansetron (ZOFRAN ODT) 4 MG TABLET DISPERSIBLE Take 1 Tablet by mouth every 6 hours as needed for Nausea/Vomiting., Disp-10 Tablet, R-0, Normal             FINAL IMPRESSION  1. Right upper quadrant abdominal pain    2. Nausea and vomiting, unspecified vomiting type

## 2025-02-03 NOTE — ED NOTES
"Sridhar Underwood has been discharged from the Emergency Room.    IV discontinued and gauze bandage placed, pt in possession of belongings.    Discharge instructions, which include signs and symptoms to monitor patient for, as well as detailed information regarding Nausea, vomiting and right upper quadrant pain provided.  Patient verbalizes understanding of follow up care and medication management. All questions and concerns addressed at this time.     Patient provided with education on when to return to the ER and verbally understands with no concerns. Patient advised on setting up MyChart and information provided about patient survey.  Patient leaves ER in no apparent distress. This RN provided education regarding returning to the ER for any new concerns or changes in patient's condition.      /80   Pulse 64   Temp 36.8 °C (98.2 °F) (Temporal)   Resp 18   Ht 1.651 m (5' 5\")   Wt (!) 127 kg (280 lb)   SpO2 94%   BMI 46.59 kg/m²    "

## 2025-02-24 ENCOUNTER — HOSPITAL ENCOUNTER (OUTPATIENT)
Dept: RADIOLOGY | Facility: MEDICAL CENTER | Age: 40
End: 2025-02-24
Payer: COMMERCIAL

## 2025-02-24 DIAGNOSIS — R10.11 ABDOMINAL PAIN, RIGHT UPPER QUADRANT: ICD-10-CM

## 2025-02-24 DIAGNOSIS — R19.7 DIARRHEA OF PRESUMED INFECTIOUS ORIGIN: ICD-10-CM

## 2025-02-24 DIAGNOSIS — R14.2 FLATULENCE, ERUCTATION, AND GAS PAIN: ICD-10-CM

## 2025-02-24 DIAGNOSIS — R14.1 FLATULENCE, ERUCTATION, AND GAS PAIN: ICD-10-CM

## 2025-02-24 DIAGNOSIS — R14.0 GASTRIC TYMPANY: ICD-10-CM

## 2025-02-24 DIAGNOSIS — K21.9 CHALASIA OF LOWER ESOPHAGEAL SPHINCTER: ICD-10-CM

## 2025-02-24 DIAGNOSIS — R14.3 FLATULENCE, ERUCTATION, AND GAS PAIN: ICD-10-CM

## 2025-04-01 ENCOUNTER — HOSPITAL ENCOUNTER (OUTPATIENT)
Dept: LAB | Facility: MEDICAL CENTER | Age: 40
End: 2025-04-01
Attending: FAMILY MEDICINE
Payer: COMMERCIAL

## 2025-04-01 LAB
ALBUMIN SERPL BCP-MCNC: 4 G/DL (ref 3.2–4.9)
ALBUMIN/GLOB SERPL: 1.2 G/DL
ALP SERPL-CCNC: 88 U/L (ref 30–99)
ALT SERPL-CCNC: 65 U/L (ref 2–50)
ANION GAP SERPL CALC-SCNC: 12 MMOL/L (ref 7–16)
AST SERPL-CCNC: 42 U/L (ref 12–45)
BASOPHILS # BLD AUTO: 1.1 % (ref 0–1.8)
BASOPHILS # BLD: 0.08 K/UL (ref 0–0.12)
BILIRUB SERPL-MCNC: 0.4 MG/DL (ref 0.1–1.5)
BUN SERPL-MCNC: 10 MG/DL (ref 8–22)
CALCIUM ALBUM COR SERPL-MCNC: 9.3 MG/DL (ref 8.5–10.5)
CALCIUM SERPL-MCNC: 9.3 MG/DL (ref 8.5–10.5)
CHLORIDE SERPL-SCNC: 107 MMOL/L (ref 96–112)
CHOLEST SERPL-MCNC: 190 MG/DL (ref 100–199)
CO2 SERPL-SCNC: 20 MMOL/L (ref 20–33)
CREAT SERPL-MCNC: 0.67 MG/DL (ref 0.5–1.4)
EOSINOPHIL # BLD AUTO: 0.27 K/UL (ref 0–0.51)
EOSINOPHIL NFR BLD: 3.7 % (ref 0–6.9)
ERYTHROCYTE [DISTWIDTH] IN BLOOD BY AUTOMATED COUNT: 43.6 FL (ref 35.9–50)
EST. AVERAGE GLUCOSE BLD GHB EST-MCNC: 128 MG/DL
FASTING STATUS PATIENT QL REPORTED: NORMAL
GFR SERPLBLD CREATININE-BSD FMLA CKD-EPI: 122 ML/MIN/1.73 M 2
GLOBULIN SER CALC-MCNC: 3.4 G/DL (ref 1.9–3.5)
GLUCOSE SERPL-MCNC: 114 MG/DL (ref 65–99)
HBA1C MFR BLD: 6.1 % (ref 4–5.6)
HCT VFR BLD AUTO: 43.4 % (ref 42–52)
HDLC SERPL-MCNC: 20 MG/DL
HGB BLD-MCNC: 14.5 G/DL (ref 14–18)
IMM GRANULOCYTES # BLD AUTO: 0.03 K/UL (ref 0–0.11)
IMM GRANULOCYTES NFR BLD AUTO: 0.4 % (ref 0–0.9)
LDLC SERPL CALC-MCNC: ABNORMAL MG/DL
LYMPHOCYTES # BLD AUTO: 2.22 K/UL (ref 1–4.8)
LYMPHOCYTES NFR BLD: 30.2 % (ref 22–41)
MCH RBC QN AUTO: 30 PG (ref 27–33)
MCHC RBC AUTO-ENTMCNC: 33.4 G/DL (ref 32.3–36.5)
MCV RBC AUTO: 89.7 FL (ref 81.4–97.8)
MONOCYTES # BLD AUTO: 0.77 K/UL (ref 0–0.85)
MONOCYTES NFR BLD AUTO: 10.5 % (ref 0–13.4)
NEUTROPHILS # BLD AUTO: 3.97 K/UL (ref 1.82–7.42)
NEUTROPHILS NFR BLD: 54.1 % (ref 44–72)
NRBC # BLD AUTO: 0 K/UL
NRBC BLD-RTO: 0 /100 WBC (ref 0–0.2)
PLATELET # BLD AUTO: 207 K/UL (ref 164–446)
PMV BLD AUTO: 11.9 FL (ref 9–12.9)
POTASSIUM SERPL-SCNC: 4 MMOL/L (ref 3.6–5.5)
PROT SERPL-MCNC: 7.4 G/DL (ref 6–8.2)
RBC # BLD AUTO: 4.84 M/UL (ref 4.7–6.1)
RHEUMATOID FACT SER IA-ACNC: <10 IU/ML (ref 0–14)
SODIUM SERPL-SCNC: 139 MMOL/L (ref 135–145)
TRIGL SERPL-MCNC: 528 MG/DL (ref 0–149)
WBC # BLD AUTO: 7.3 K/UL (ref 4.8–10.8)

## 2025-04-01 PROCEDURE — 80061 LIPID PANEL: CPT

## 2025-04-01 PROCEDURE — 83036 HEMOGLOBIN GLYCOSYLATED A1C: CPT

## 2025-04-01 PROCEDURE — 80053 COMPREHEN METABOLIC PANEL: CPT

## 2025-04-01 PROCEDURE — 86431 RHEUMATOID FACTOR QUANT: CPT

## 2025-04-01 PROCEDURE — 36415 COLL VENOUS BLD VENIPUNCTURE: CPT

## 2025-04-01 PROCEDURE — 85025 COMPLETE CBC W/AUTO DIFF WBC: CPT

## 2025-04-07 ENCOUNTER — HOSPITAL ENCOUNTER (OUTPATIENT)
Facility: MEDICAL CENTER | Age: 40
End: 2025-04-07
Payer: COMMERCIAL

## 2025-04-07 PROCEDURE — 82653 EL-1 FECAL QUANTITATIVE: CPT

## 2025-04-11 LAB — ELASTASE PANC STL-MCNT: >800 UG/G

## 2025-04-28 ENCOUNTER — RESULTS FOLLOW-UP (OUTPATIENT)
Dept: URGENT CARE | Facility: PHYSICIAN GROUP | Age: 40
End: 2025-04-28

## 2025-04-28 ENCOUNTER — OFFICE VISIT (OUTPATIENT)
Dept: URGENT CARE | Facility: PHYSICIAN GROUP | Age: 40
End: 2025-04-28
Payer: COMMERCIAL

## 2025-04-28 VITALS
RESPIRATION RATE: 20 BRPM | WEIGHT: 270 LBS | DIASTOLIC BLOOD PRESSURE: 70 MMHG | SYSTOLIC BLOOD PRESSURE: 118 MMHG | TEMPERATURE: 99 F | HEART RATE: 99 BPM | OXYGEN SATURATION: 96 % | HEIGHT: 65 IN | BODY MASS INDEX: 44.98 KG/M2

## 2025-04-28 DIAGNOSIS — Z72.89 VAPES NON-NICOTINE CONTAINING SUBSTANCE: ICD-10-CM

## 2025-04-28 DIAGNOSIS — R09.89 CHEST CONGESTION: ICD-10-CM

## 2025-04-28 DIAGNOSIS — J34.89 NASAL CONGESTION WITH RHINORRHEA: ICD-10-CM

## 2025-04-28 DIAGNOSIS — R05.1 ACUTE COUGH: ICD-10-CM

## 2025-04-28 DIAGNOSIS — R50.9 FEVER, UNSPECIFIED: ICD-10-CM

## 2025-04-28 DIAGNOSIS — R09.81 NASAL CONGESTION WITH RHINORRHEA: ICD-10-CM

## 2025-04-28 DIAGNOSIS — B34.9 VIRAL ILLNESS: ICD-10-CM

## 2025-04-28 LAB
FLUAV RNA SPEC QL NAA+PROBE: NEGATIVE
FLUBV RNA SPEC QL NAA+PROBE: NEGATIVE
RSV RNA SPEC QL NAA+PROBE: NEGATIVE
SARS-COV-2 RNA RESP QL NAA+PROBE: NEGATIVE

## 2025-04-28 RX ORDER — GEMFIBROZIL 600 MG/1
600 TABLET, FILM COATED ORAL 2 TIMES DAILY
COMMUNITY
Start: 2025-04-08

## 2025-04-28 RX ORDER — METHYLPREDNISOLONE 4 MG/1
TABLET ORAL
Qty: 21 TABLET | Refills: 0 | Status: SHIPPED | OUTPATIENT
Start: 2025-04-28

## 2025-04-28 RX ORDER — HYDROXYZINE HYDROCHLORIDE 50 MG/1
1 TABLET, FILM COATED ORAL
COMMUNITY

## 2025-04-28 RX ORDER — FLUTICASONE PROPIONATE 50 MCG
1 SPRAY, SUSPENSION (ML) NASAL DAILY
Qty: 16 G | Refills: 0 | Status: SHIPPED | OUTPATIENT
Start: 2025-04-28

## 2025-04-28 RX ORDER — AMITRIPTYLINE HYDROCHLORIDE 10 MG/1
TABLET ORAL
COMMUNITY
Start: 2025-04-27

## 2025-04-28 RX ORDER — BENZONATATE 100 MG/1
100 CAPSULE ORAL 3 TIMES DAILY PRN
Qty: 60 CAPSULE | Refills: 0 | Status: SHIPPED | OUTPATIENT
Start: 2025-04-28

## 2025-04-28 RX ORDER — ALLOPURINOL 300 MG/1
300 TABLET ORAL
COMMUNITY
Start: 2025-04-07

## 2025-04-28 ASSESSMENT — ENCOUNTER SYMPTOMS
VOMITING: 0
NAUSEA: 0
MYALGIAS: 1
WHEEZING: 0
HEMOPTYSIS: 0
SHORTNESS OF BREATH: 0
SPUTUM PRODUCTION: 0
BACK PAIN: 0
SORE THROAT: 0
FEVER: 1
COUGH: 1
SINUS PAIN: 0
EYE DISCHARGE: 0
CHILLS: 0
STRIDOR: 0

## 2025-04-28 ASSESSMENT — FIBROSIS 4 INDEX: FIB4 SCORE: 0.98

## 2025-04-28 NOTE — PROGRESS NOTES
"  Subjective:   CHIEF COMPLAINT  Chief Complaint   Patient presents with    Other     Lung problem,right side,x1 day         Sridhar Underwood is a very pleasant 40 y.o. male who presents for Other (Lung problem,right side,x1 day)      Patient presents with complaints of \"lung pain,\" cough, chest congestion, nasal congestion, and bodyaches.  States symptoms started about 2 days ago, he denies any sick contacts with similar symptoms that he is aware of.  Has been treating with watchful waiting and fluids though has been ineffective.  He denies any shortness of breath stridor or wheezing or substernal chest pain    Other  Associated symptoms include congestion, coughing, a fever and myalgias. Pertinent negatives include no chest pain, chills, nausea, rash, sore throat or vomiting.       Review of Systems   Constitutional:  Positive for fever. Negative for chills.   HENT:  Positive for congestion. Negative for ear discharge, ear pain, sinus pain and sore throat.    Eyes:  Negative for discharge.   Respiratory:  Positive for cough. Negative for hemoptysis, sputum production, shortness of breath, wheezing and stridor.    Cardiovascular:  Negative for chest pain.   Gastrointestinal:  Negative for nausea and vomiting.   Genitourinary: Negative.    Musculoskeletal:  Positive for myalgias. Negative for back pain.   Skin:  Negative for rash.     Refer to HPI for additional details.    During this visit, appropriate PPE was worn, and hand hygiene was performed.    PMH:  has a past medical history of GERD (gastroesophageal reflux disease), Heart burn, Kidney stone, PONV (postoperative nausea and vomiting), and Postoperative pain (11/28/2023).    He has no past medical history of Acute nasopharyngitis, Anesthesia, Arrhythmia, Arthritis, Asthma, Bowel habit changes, Breath shortness, Bronchitis, Cancer (HCC), Carcinoma in situ of respiratory system, Cataract, Congestive heart failure (HCC), Continuous ambulatory peritoneal " dialysis status (HCC), Coughing blood, Dental disorder, Diabetes (HCC), Dialysis patient (HCC), Disorder of thyroid, Emphysema of lung (HCC), Glaucoma, Gynecological disorder, Heart valve disease, Hemorrhagic disorder (HCC), Hepatitis A, Hepatitis B, Hepatitis C, Hiatus hernia syndrome, High cholesterol, Hypertension, Indigestion, Infectious disease, Jaundice, Myocardial infarct (HCC), Pacemaker, Pneumonia, Pregnant, Psychiatric problem, Rheumatic fever, Seizure (HCC), Sleep apnea, Snoring, Stroke (HCC), Tuberculosis, Urinary bladder disorder, or Urinary incontinence.    MEDS:   Current Outpatient Medications:     metFORMIN (GLUCOPHAGE) 500 MG Tab, Take 500 mg by mouth every day., Disp: , Rfl:     amitriptyline (ELAVIL) 10 MG Tab, , Disp: , Rfl:     allopurinol (ZYLOPRIM) 300 MG Tab, Take 300 mg by mouth every day., Disp: , Rfl:     gemfibrozil (LOPID) 600 MG Tab, Take 600 mg by mouth 2 times a day., Disp: , Rfl:     methylPREDNISolone (MEDROL DOSEPAK) 4 MG Tablet Therapy Pack, Follow schedule on package instructions., Disp: 21 Tablet, Rfl: 0    fluticasone (FLONASE) 50 MCG/ACT nasal spray, Administer 1 Spray into affected nostril(S) every day., Disp: 16 g, Rfl: 0    benzonatate (TESSALON) 100 MG Cap, Take 1 Capsule by mouth 3 times a day as needed for Cough., Disp: 60 Capsule, Rfl: 0    ondansetron (ZOFRAN ODT) 4 MG TABLET DISPERSIBLE, Dissolve 1 Tablet by mouth every 6 hours as needed for Nausea/Vomiting., Disp: 10 Tablet, Rfl: 0    omeprazole (PRILOSEC) 20 MG delayed-release capsule, Take 20 mg by mouth every day., Disp: , Rfl:     ondansetron (ZOFRAN) 8 MG Tab, TAKE 8 MG TABLET BY MOUTH 3 TIMES A DAY AS NEEDED, Disp: , Rfl:     acetaminophen (TYLENOL) 500 MG Tab, Take 1,000 mg by mouth every 6 hours as needed for Mild Pain. 1,000 mg = 2 tabs, Disp: , Rfl:     pantoprazole (PROTONIX) 20 MG tablet, Take 1 Tablet by mouth 2 times a day., Disp: 60 Tablet, Rfl: 1    famotidine (PEPCID) 20 MG Tab, Take 1 Tablet by  mouth 2 times a day as needed (heartburn in addition to PPI if ongoing heartburn)., Disp: 60 Tablet, Rfl: 0    lidocaine (XYLOCAINE) 2 % Solution, Take 15 mL by mouth every 6 hours as needed for Other (Severe heartburn and chest pain). Mix with 15 mL of maalox to make a GI cocktail, Disp: 300 mL, Rfl: 1    mag hydrox-al hydrox-simeth (MAALOX PLUS ES OR MYLANTA DS) 400-400-40 MG/5ML Suspension, Take 15 mL by mouth every 6 hours as needed (severe heartburn and chest pain)., Disp: 355 mL, Rfl: 0    prochlorperazine (COMPAZINE) 5 MG Tab, Take 1 Tablet by mouth every 6 hours as needed for Nausea/Vomiting (if ondansetron / zofran ineffective)., Disp: 30 Tablet, Rfl: 1    hydrOXYzine HCl (ATARAX) 50 MG Tab, Take 1 Tablet by mouth. (Patient not taking: Reported on 4/28/2025), Disp: , Rfl:     oseltamivir (TAMIFLU) 75 MG Cap, Take 1 Capsule by mouth 2 times a day. (Patient not taking: Reported on 4/28/2025), Disp: 10 Capsule, Rfl: 0    sucralfate (CARAFATE) 1 GM Tab, Take 1 g by mouth 4 Times a Day,Before Meals and at Bedtime. (Patient not taking: Reported on 4/28/2025), Disp: , Rfl:     ondansetron (ZOFRAN) 4 MG Tab tablet, Take 1 Tablet by mouth every four hours as needed for Nausea/Vomiting. (Patient not taking: Reported on 4/28/2025), Disp: 30 Each, Rfl: 1    ALLERGIES: No Known Allergies  SURGHX:   Past Surgical History:   Procedure Laterality Date    JYOTHI BY LAPAROSCOPY N/A 11/28/2023    Procedure: LAPAROSCOPIC CHOLECYSTECTOMY;  Surgeon: Danny Keenan M.D.;  Location: SURGERY [facility];  Service: General    APPENDECTOMY      OTHER ORTHOPEDIC SURGERY      rotator  right    OTHER ORTHOPEDIC SURGERY      clavical     SOCHX:  reports that he quit smoking about 4 years ago. His smoking use included cigarettes. He has a 5 pack-year smoking history. His smokeless tobacco use includes chew. He reports that he does not currently use alcohol. He reports current drug use. Drug: Inhaled.    FH: Per HPI as  "applicable/pertinent.    Medications, Allergies, and current problem list reviewed today in Epic.     Objective:     /70   Pulse 99   Temp 37.2 °C (99 °F) (Temporal)   Resp 20   Ht 1.651 m (5' 5\")   Wt 122 kg (270 lb)   SpO2 96%     Physical Exam  Constitutional:       General: He is not in acute distress.     Appearance: Normal appearance.   HENT:      Head: Normocephalic.      Right Ear: Tympanic membrane, ear canal and external ear normal.      Left Ear: Tympanic membrane, ear canal and external ear normal.      Nose: Congestion and rhinorrhea present.      Mouth/Throat:      Mouth: Mucous membranes are dry.      Pharynx: No oropharyngeal exudate or posterior oropharyngeal erythema.   Eyes:      Conjunctiva/sclera: Conjunctivae normal.   Cardiovascular:      Rate and Rhythm: Normal rate.   Pulmonary:      Effort: Pulmonary effort is normal. No respiratory distress.      Breath sounds: Normal breath sounds. No stridor. No wheezing, rhonchi or rales.   Chest:      Chest wall: No tenderness.   Musculoskeletal:      Cervical back: Normal range of motion. No tenderness.   Lymphadenopathy:      Cervical: No cervical adenopathy.   Neurological:      Mental Status: He is alert.         Assessment/Plan:     Diagnosis and associated orders:     1. Viral illness    2. Acute cough  - POCT CoV-2, Flu A/B, RSV by PCR  - methylPREDNISolone (MEDROL DOSEPAK) 4 MG Tablet Therapy Pack; Follow schedule on package instructions.  Dispense: 21 Tablet; Refill: 0  - benzonatate (TESSALON) 100 MG Cap; Take 1 Capsule by mouth 3 times a day as needed for Cough.  Dispense: 60 Capsule; Refill: 0    3. Chest congestion  - POCT CoV-2, Flu A/B, RSV by PCR    4. Nasal congestion with rhinorrhea  - POCT CoV-2, Flu A/B, RSV by PCR  - fluticasone (FLONASE) 50 MCG/ACT nasal spray; Administer 1 Spray into affected nostril(S) every day.  Dispense: 16 g; Refill: 0    5. Fever, unspecified  - POCT CoV-2, Flu A/B, RSV by PCR    6. Vapes " non-nicotine containing substance    Other orders  - metFORMIN (GLUCOPHAGE) 500 MG Tab; Take 500 mg by mouth every day.  - hydrOXYzine HCl (ATARAX) 50 MG Tab; Take 1 Tablet by mouth. (Patient not taking: Reported on 4/28/2025)  - amitriptyline (ELAVIL) 10 MG Tab  - allopurinol (ZYLOPRIM) 300 MG Tab; Take 300 mg by mouth every day.  - gemfibrozil (LOPID) 600 MG Tab; Take 600 mg by mouth 2 times a day.     Comments/MDM:     Patient history and physical exam are consistent with acute cough with concomitant chest congestion and mild fever, nasal congestion and rhinorrhea, and mild fever.  Patient presents ill-appearing though nontoxic no red flags or acute respiratory distress noted  I discussed HPI and physical exam with the patient.  Did recommend doing in clinic viral test at this time to rule out any common etiologies to explain patient's symptoms  In clinic viral swab negative  Outpatient management will consist of copious fluids and electrolytes, Tylenol and ibuprofen at a staggered schedule, warm salt water gargles as needed for throat pain, Flonase for nasal decongestion and eustachian drainage, benzonatate for persistent cough, short course steroids for cough and inflammation, monitor symptoms  Follow up in 3-5 days if no improvement in symptoms     I spent 7 minutes discussing patient's tobacco use.  Patient endorses using cigarettes and vape for about 20 years quit few months ago.  Does currently use cannabis vape  They are interested and have been attempting to start quitting.  I discussed various health concerns relating to tobacco use including exacerbating/prolonging/causing today's symptoms, hypertension, cancer, pulmonary/cardiovascular disease, as well as secondhand exposure risks.  I discussed potential cessation options such as tobacco/nicotine replacement products, cessation and reduction of tobacco intake, discussing with PCP about potential medication use, as well as psychotherapy use.            Differential diagnosis, natural history, supportive care, and indications for immediate follow-up discussed.    Advised the patient to follow-up with the primary care physician for recheck, reevaluation, and consideration of further management.    Please note that this dictation was created using voice recognition software. I have made a reasonable attempt to correct obvious errors, but I expect that there are errors of grammar and possibly content that I did not discover before finalizing the note.    This note was electronically signed by JIMBO Block

## 2025-04-28 NOTE — LETTER
April 28, 2025    To Whom It May Concern:         This is confirmation that Sridhar Underwood attended his scheduled appointment with JIMBO Block on 4/28/25.         If you have any questions please do not hesitate to call me at the phone number listed below.    Sincerely,          SEBASTIEN Block.  [phone]                  
no

## 2025-08-26 ENCOUNTER — HOSPITAL ENCOUNTER (OUTPATIENT)
Dept: RADIOLOGY | Facility: MEDICAL CENTER | Age: 40
End: 2025-08-26
Attending: NURSE PRACTITIONER
Payer: COMMERCIAL

## 2025-08-26 ENCOUNTER — OFFICE VISIT (OUTPATIENT)
Dept: URGENT CARE | Facility: PHYSICIAN GROUP | Age: 40
End: 2025-08-26
Payer: COMMERCIAL

## 2025-08-26 VITALS
SYSTOLIC BLOOD PRESSURE: 116 MMHG | RESPIRATION RATE: 14 BRPM | HEART RATE: 82 BPM | TEMPERATURE: 98.3 F | BODY MASS INDEX: 42.49 KG/M2 | HEIGHT: 65 IN | DIASTOLIC BLOOD PRESSURE: 80 MMHG | WEIGHT: 255 LBS | OXYGEN SATURATION: 98 %

## 2025-08-26 DIAGNOSIS — N53.12 PAIN WITH EJACULATION: ICD-10-CM

## 2025-08-26 DIAGNOSIS — R30.0 DYSURIA: ICD-10-CM

## 2025-08-26 DIAGNOSIS — R10.9 FLANK PAIN: ICD-10-CM

## 2025-08-26 DIAGNOSIS — N50.82 ACUTE PAIN IN SCROTUM: ICD-10-CM

## 2025-08-26 DIAGNOSIS — Z87.442 HISTORY OF KIDNEY STONES: ICD-10-CM

## 2025-08-26 DIAGNOSIS — N34.2 URETHRITIS: Primary | ICD-10-CM

## 2025-08-26 LAB
APPEARANCE UR: CLEAR
BILIRUB UR STRIP-MCNC: NEGATIVE MG/DL
COLOR UR AUTO: NORMAL
GLUCOSE UR STRIP.AUTO-MCNC: NEGATIVE MG/DL
KETONES UR STRIP.AUTO-MCNC: NEGATIVE MG/DL
LEUKOCYTE ESTERASE UR QL STRIP.AUTO: NEGATIVE
NITRITE UR QL STRIP.AUTO: NEGATIVE
PH UR STRIP.AUTO: 5.5 [PH] (ref 5–8)
PROT UR QL STRIP: NEGATIVE MG/DL
RBC UR QL AUTO: NEGATIVE
SP GR UR STRIP.AUTO: 1.02
UROBILINOGEN UR STRIP-MCNC: 0.2 MG/DL

## 2025-08-26 PROCEDURE — 99214 OFFICE O/P EST MOD 30 MIN: CPT | Performed by: NURSE PRACTITIONER

## 2025-08-26 PROCEDURE — 74176 CT ABD & PELVIS W/O CONTRAST: CPT

## 2025-08-26 PROCEDURE — 81002 URINALYSIS NONAUTO W/O SCOPE: CPT | Performed by: NURSE PRACTITIONER

## 2025-08-26 PROCEDURE — 3074F SYST BP LT 130 MM HG: CPT | Performed by: NURSE PRACTITIONER

## 2025-08-26 PROCEDURE — 3079F DIAST BP 80-89 MM HG: CPT | Performed by: NURSE PRACTITIONER

## 2025-08-26 PROCEDURE — 76870 US EXAM SCROTUM: CPT

## 2025-08-26 RX ORDER — CIPROFLOXACIN 500 MG/1
500 TABLET, FILM COATED ORAL 2 TIMES DAILY
Qty: 28 TABLET | Refills: 0 | Status: SHIPPED | OUTPATIENT
Start: 2025-08-26 | End: 2025-09-09

## 2025-08-26 ASSESSMENT — FIBROSIS 4 INDEX: FIB4 SCORE: 0.98

## (undated) DEVICE — ELECTRODE DUAL RETURN W/ CORD - (50/PK)

## (undated) DEVICE — CHLORAPREP 26 ML APPLICATOR - ORANGE TINT(25/CA)

## (undated) DEVICE — GOWN WARMING STANDARD FLEX - (30/CA)

## (undated) DEVICE — SODIUM CHL IRRIGATION 0.9% 1000ML (12EA/CA)

## (undated) DEVICE — DERMABOND ADVANCED - (12EA/BX)

## (undated) DEVICE — GLOVE BIOGEL INDICATOR SZ 8 SURGICAL PF LTX - (50/BX 4BX/CA)

## (undated) DEVICE — TUBING CLEARLINK DUO-VENT - C-FLO (48EA/CA)

## (undated) DEVICE — SUTURE 0 VICRYL PLUS UR-6 - 27 INCH (36/BX)

## (undated) DEVICE — SET EXTENSION WITH 2 PORTS (48EA/CA) ***PART #2C8610 IS A SUBSTITUTE*****

## (undated) DEVICE — ELECTRODE 5MM LHK LAPSCP STERILE DISP- MEGADYNE  (5/CA)

## (undated) DEVICE — PACK LAP CHOLE OR - (2EA/CA)

## (undated) DEVICE — GLOVE BIOGEL PI INDICATOR SZ 8.0 SURGICAL PF LF -(50/BX 4BX/CA)

## (undated) DEVICE — GLOVESZ 8.5 BIOGEL PI MICRO - PF LF (50PR/BX)

## (undated) DEVICE — SET TUBING PNEUMOCLEAR HIGH FLOW SMOKE EVACUATION (10EA/BX)

## (undated) DEVICE — SUTURE GENERAL

## (undated) DEVICE — SLEEVE, VASO, THIGH, MED

## (undated) DEVICE — LACTATED RINGERS INJ 1000 ML - (14EA/CA 60CA/PF)

## (undated) DEVICE — HEMOSTAT ABSORBABLE POWDER SURGICEL 3G (5EA/BX)

## (undated) DEVICE — TROCAR LAPSCP 100MM 12MM NTHRD - (6/BX)

## (undated) DEVICE — APPLICATOR ENDOSCOPIC SURGICEL (5EA/BX)

## (undated) DEVICE — CANISTER SUCTION 3000ML MECHANICAL FILTER AUTO SHUTOFF MEDI-VAC NONSTERILE LF DISP  (40EA/CA)

## (undated) DEVICE — GOWN SURGEONS X-LARGE - DISP. (30/CA)

## (undated) DEVICE — SYSTEM CLEARIFY VISUALIZATION (10EA/PK)

## (undated) DEVICE — CANNULA W/SEAL 5X100 Z-THRE - ADED KII (12/BX)

## (undated) DEVICE — ANTI-FOG SOLUTION - 60BTL/CA

## (undated) DEVICE — SUCTION INSTRUMENT YANKAUER BULBOUS TIP W/O VENT (50EA/CA)

## (undated) DEVICE — BLADE SURGICAL #15 - (50/BX 3BX/CA)

## (undated) DEVICE — SCISSORS 5MM CVD (6EA/BX)

## (undated) DEVICE — SET LEADWIRE 5 LEAD BEDSIDE DISPOSABLE ECG (1SET OF 5/EA)

## (undated) DEVICE — TROCAR 5X100 NON BLADED Z-TH - READ KII (6/BX)

## (undated) DEVICE — SENSOR OXIMETER ADULT SPO2 RD SET (20EA/BX)

## (undated) DEVICE — COVER LIGHT HANDLE ALC PLUS DISP (18EA/BX)

## (undated) DEVICE — SUTURE 4-0 MONOCRYL PLUS PS-2 - 27 INCH (36/BX)

## (undated) DEVICE — GLOVE SZ 7.5 BIOGEL PI MICRO - PF LF (50PR/BX)